# Patient Record
Sex: FEMALE | Race: BLACK OR AFRICAN AMERICAN | Employment: FULL TIME | URBAN - METROPOLITAN AREA
[De-identification: names, ages, dates, MRNs, and addresses within clinical notes are randomized per-mention and may not be internally consistent; named-entity substitution may affect disease eponyms.]

---

## 2017-05-15 ENCOUNTER — APPOINTMENT (OUTPATIENT)
Dept: OCCUPATIONAL MEDICINE | Age: 32
End: 2017-05-15
Attending: FAMILY MEDICINE

## 2017-05-24 ENCOUNTER — APPOINTMENT (OUTPATIENT)
Dept: OCCUPATIONAL MEDICINE | Age: 32
End: 2017-05-24
Attending: EMERGENCY MEDICINE

## 2017-08-11 ENCOUNTER — OFFICE VISIT (OUTPATIENT)
Dept: INTERNAL MEDICINE CLINIC | Facility: CLINIC | Age: 32
End: 2017-08-11

## 2017-08-11 VITALS
SYSTOLIC BLOOD PRESSURE: 109 MMHG | DIASTOLIC BLOOD PRESSURE: 72 MMHG | HEART RATE: 76 BPM | WEIGHT: 195 LBS | HEIGHT: 64.5 IN | BODY MASS INDEX: 32.89 KG/M2 | TEMPERATURE: 98 F

## 2017-08-11 DIAGNOSIS — K21.9 GASTROESOPHAGEAL REFLUX DISEASE, ESOPHAGITIS PRESENCE NOT SPECIFIED: ICD-10-CM

## 2017-08-11 DIAGNOSIS — Z00.00 ENCOUNTER FOR MEDICAL EXAMINATION TO ESTABLISH CARE: Primary | ICD-10-CM

## 2017-08-11 DIAGNOSIS — F41.9 ANXIETY: ICD-10-CM

## 2017-08-11 LAB
ALBUMIN SERPL BCP-MCNC: 3.5 G/DL (ref 3.5–4.8)
ALBUMIN/GLOB SERPL: 1 {RATIO} (ref 1–2)
ALP SERPL-CCNC: 39 U/L (ref 32–100)
ALT SERPL-CCNC: 10 U/L (ref 14–54)
ANION GAP SERPL CALC-SCNC: 10 MMOL/L (ref 0–18)
AST SERPL-CCNC: 15 U/L (ref 15–41)
BASOPHILS # BLD: 0 K/UL (ref 0–0.2)
BASOPHILS NFR BLD: 1 %
BILIRUB SERPL-MCNC: 0.2 MG/DL (ref 0.3–1.2)
BUN SERPL-MCNC: 10 MG/DL (ref 8–20)
BUN/CREAT SERPL: 9.9 (ref 10–20)
CALCIUM SERPL-MCNC: 8.9 MG/DL (ref 8.5–10.5)
CHLORIDE SERPL-SCNC: 105 MMOL/L (ref 95–110)
CHOLEST SERPL-MCNC: 148 MG/DL (ref 110–200)
CO2 SERPL-SCNC: 22 MMOL/L (ref 22–32)
CREAT SERPL-MCNC: 1.01 MG/DL (ref 0.5–1.5)
EOSINOPHIL # BLD: 0 K/UL (ref 0–0.7)
EOSINOPHIL NFR BLD: 1 %
ERYTHROCYTE [DISTWIDTH] IN BLOOD BY AUTOMATED COUNT: 13.5 % (ref 11–15)
GLOBULIN PLAS-MCNC: 3.4 G/DL (ref 2.5–3.7)
GLUCOSE SERPL-MCNC: 112 MG/DL (ref 70–99)
HCT VFR BLD AUTO: 37.8 % (ref 35–48)
HDLC SERPL-MCNC: 43 MG/DL
HGB BLD-MCNC: 12.2 G/DL (ref 12–16)
LDLC SERPL CALC-MCNC: 70 MG/DL (ref 0–99)
LYMPHOCYTES # BLD: 2.4 K/UL (ref 1–4)
LYMPHOCYTES NFR BLD: 33 %
MCH RBC QN AUTO: 27.2 PG (ref 27–32)
MCHC RBC AUTO-ENTMCNC: 32.3 G/DL (ref 32–37)
MCV RBC AUTO: 84.2 FL (ref 80–100)
MONOCYTES # BLD: 0.4 K/UL (ref 0–1)
MONOCYTES NFR BLD: 5 %
NEUTROPHILS # BLD AUTO: 4.3 K/UL (ref 1.8–7.7)
NEUTROPHILS NFR BLD: 60 %
NONHDLC SERPL-MCNC: 105 MG/DL
OSMOLALITY UR CALC.SUM OF ELEC: 284 MOSM/KG (ref 275–295)
PLATELET # BLD AUTO: 215 K/UL (ref 140–400)
PMV BLD AUTO: 9 FL (ref 7.4–10.3)
POTASSIUM SERPL-SCNC: 3.8 MMOL/L (ref 3.3–5.1)
PROT SERPL-MCNC: 6.9 G/DL (ref 5.9–8.4)
RBC # BLD AUTO: 4.5 M/UL (ref 3.7–5.4)
SODIUM SERPL-SCNC: 137 MMOL/L (ref 136–144)
TRIGL SERPL-MCNC: 177 MG/DL (ref 1–149)
TSH SERPL-ACNC: 2.24 UIU/ML (ref 0.45–5.33)
WBC # BLD AUTO: 7.2 K/UL (ref 4–11)

## 2017-08-11 PROCEDURE — 36415 COLL VENOUS BLD VENIPUNCTURE: CPT | Performed by: INTERNAL MEDICINE

## 2017-08-11 PROCEDURE — 99385 PREV VISIT NEW AGE 18-39: CPT | Performed by: INTERNAL MEDICINE

## 2017-08-11 RX ORDER — ACYCLOVIR 400 MG/1
400 TABLET ORAL AS NEEDED
Qty: 90 TABLET | Refills: 1 | Status: SHIPPED | OUTPATIENT
Start: 2017-08-11 | End: 2018-09-13

## 2017-08-11 RX ORDER — OMEPRAZOLE 20 MG/1
20 CAPSULE, DELAYED RELEASE ORAL
Qty: 90 CAPSULE | Refills: 0 | Status: SHIPPED | OUTPATIENT
Start: 2017-08-11 | End: 2017-11-27

## 2017-08-11 RX ORDER — ACYCLOVIR 400 MG/1
400 TABLET ORAL AS NEEDED
COMMUNITY
Start: 2016-07-19 | End: 2017-08-11

## 2017-08-11 RX ORDER — NORGESTIMATE AND ETHINYL ESTRADIOL 7DAYSX3 28
1 KIT ORAL
COMMUNITY
Start: 2017-07-24 | End: 2017-10-04 | Stop reason: ALTCHOICE

## 2017-08-11 RX ORDER — ESCITALOPRAM OXALATE 10 MG/1
10 TABLET ORAL NIGHTLY
COMMUNITY
Start: 2017-04-27 | End: 2017-08-11

## 2017-08-11 RX ORDER — ESCITALOPRAM OXALATE 10 MG/1
10 TABLET ORAL NIGHTLY
Qty: 90 TABLET | Refills: 1 | Status: SHIPPED | OUTPATIENT
Start: 2017-08-11 | End: 2018-09-13

## 2017-08-11 NOTE — PROGRESS NOTES
HPI:    Patient ID: Nikki Thomas is a 32year old female.     HPI She came in today to establish care with new physician  She states that she does have anxiety and panic atack , she run ot of medication for almost one month and she started having panic atac acyclovir 400 MG Oral Tab Take 1 tablet (400 mg total) by mouth as needed. Disp: 90 tablet Rfl: 1   escitalopram 10 MG Oral Tab Take 1 tablet (10 mg total) by mouth nightly.  Disp: 90 tablet Rfl: 1     Allergies:No Known Allergies    HISTORY:  Past Medica discharge. No scleral icterus. Neck: Normal range of motion. Neck supple. No JVD present. No tracheal tenderness present. No tracheal deviation present. No thyroid mass and no thyromegaly present.    Cardiovascular: Normal rate, regular rhythm, normal hea To Free T4 [E]    Meds This Visit:  Signed Prescriptions Disp Refills    acyclovir 400 MG Oral Tab 90 tablet 1      Sig: Take 1 tablet (400 mg total) by mouth as needed.       escitalopram 10 MG Oral Tab 90 tablet 1      Sig: Take 1 tablet (10 mg total) by

## 2017-08-11 NOTE — PATIENT INSTRUCTIONS
Encounter for medical examination to establish care  (primary encounter diagnosis)request records   Anxiety/panic atack, run out of meds, will resume citalopram, advised for relaxation technics- yoga   Gastroesophageal reflux disease, esophagitis presence

## 2017-08-12 LAB — HBA1C MFR BLD: 5.6 % (ref 4–6)

## 2017-09-20 ENCOUNTER — TELEPHONE (OUTPATIENT)
Dept: INTERNAL MEDICINE CLINIC | Facility: CLINIC | Age: 32
End: 2017-09-20

## 2017-09-20 NOTE — TELEPHONE ENCOUNTER
Patient said last time she had Depo shot was about 2 years ago. States last pap test was July 2016 at [facility] in [facility]. Was wanting to get Depo provera shot in your office.

## 2017-09-21 NOTE — TELEPHONE ENCOUNTER
Patient notified needs copy of last pap smear before we can arrange to give Depo shot. Patient to call [facility] Primary care and have copy of pap faxed to office.

## 2017-09-25 ENCOUNTER — TELEPHONE (OUTPATIENT)
Dept: INTERNAL MEDICINE CLINIC | Facility: CLINIC | Age: 32
End: 2017-09-25

## 2017-09-25 NOTE — TELEPHONE ENCOUNTER
Patient record from her last pap smear have been send and scanned .  Please review she would like to come in soon for depo shot

## 2017-09-28 ENCOUNTER — OFFICE VISIT (OUTPATIENT)
Dept: INTERNAL MEDICINE CLINIC | Facility: CLINIC | Age: 32
End: 2017-09-28

## 2017-09-28 VITALS
SYSTOLIC BLOOD PRESSURE: 122 MMHG | WEIGHT: 193 LBS | HEIGHT: 64.5 IN | DIASTOLIC BLOOD PRESSURE: 74 MMHG | BODY MASS INDEX: 32.55 KG/M2 | HEART RATE: 73 BPM

## 2017-09-28 DIAGNOSIS — Z12.4 SCREENING FOR CERVICAL CANCER: Primary | ICD-10-CM

## 2017-09-28 DIAGNOSIS — Z11.3 SCREENING FOR STD (SEXUALLY TRANSMITTED DISEASE): ICD-10-CM

## 2017-09-28 DIAGNOSIS — Z30.09 BIRTH CONTROL COUNSELING: ICD-10-CM

## 2017-09-28 DIAGNOSIS — Z23 NEED FOR PROPHYLACTIC VACCINATION AND INOCULATION AGAINST INFLUENZA: ICD-10-CM

## 2017-09-28 DIAGNOSIS — N89.8 VAGINAL DISCHARGE: ICD-10-CM

## 2017-09-28 PROCEDURE — 99212 OFFICE O/P EST SF 10 MIN: CPT | Performed by: INTERNAL MEDICINE

## 2017-09-28 PROCEDURE — 99214 OFFICE O/P EST MOD 30 MIN: CPT | Performed by: INTERNAL MEDICINE

## 2017-09-28 PROCEDURE — 90686 IIV4 VACC NO PRSV 0.5 ML IM: CPT | Performed by: INTERNAL MEDICINE

## 2017-09-28 PROCEDURE — 90471 IMMUNIZATION ADMIN: CPT | Performed by: INTERNAL MEDICINE

## 2017-09-28 NOTE — PATIENT INSTRUCTIONS
Birth Control Methods  Birth control methods are used to help prevent pregnancy. There are many different methods to choose from.  Talk to your healthcare provider about which method is right for you. Be sure to ask your provider about the effectiveness o A condom is a sheath that forms a thin barrier between the penis and the vagina. It helps prevent pregnancy by keeping sperm from entering the vagina.  When latex condoms are used, they have the added benefit of protecting against most STDs (sexually transm This method requires that you know when in your menstrual cycle you are likely to become pregnant. Then, you avoid sex during those days. This requires careful planning and good discipline. Your healthcare provider can explain more about how this works.   Arnaldo Morales © 0529-7399 [address], [address] [place]. All rights reserved. This information is not intended as a substitute for professional medical care. Always follow your healthcare professional's instructions.

## 2017-09-28 NOTE — PROGRESS NOTES
HPI:    Patient ID: Raman Saldivar is a 28year old female. HPI she came in today for pap smear and screening for std. She also wanted to discuss about contraception, she stopped taking pills one week ago because she forgets taking them . Per her her breas escitalopram 10 MG Oral Tab Take 1 tablet (10 mg total) by mouth nightly.  Disp: 90 tablet Rfl: 1   omeprazole 20 MG Oral Capsule Delayed Release Take 1 capsule (20 mg total) by mouth every morning before breakfast. Disp: 90 capsule Rfl: 0   Norgestim-Eth erythema. Eyes: Conjunctivae and EOM are normal. Pupils are equal, round, and reactive to light. Right eye exhibits no discharge. Left eye exhibits no discharge. No scleral icterus. Neck: Normal range of motion. Neck supple. No JVD present.  No tracheal disease) gc/chlamydia   Vaginal discharge- will send for bacterial vaginosis  Need for prophylactic vaccination and inoculation against influenza      Orders Placed This Encounter      Flulaval 0.5 ml 6 mon and older Quad single dose PF (69911)      ThinPr

## 2017-09-29 LAB
C TRACH DNA SPEC QL NAA+PROBE: NEGATIVE
N GONORRHOEA DNA SPEC QL NAA+PROBE: NEGATIVE

## 2017-09-30 LAB
GENITAL VAGINOSIS SCREEN: NEGATIVE
TRICHOMONAS SCREEN: NEGATIVE

## 2017-10-02 ENCOUNTER — TELEPHONE (OUTPATIENT)
Dept: INTERNAL MEDICINE CLINIC | Facility: CLINIC | Age: 32
End: 2017-10-02

## 2017-10-02 NOTE — TELEPHONE ENCOUNTER
Patient would like to come in to the office to get the depo shot please call when we have the medication and order to make an appoinment

## 2017-10-03 NOTE — TELEPHONE ENCOUNTER
[place] office has 2 depo shots. Call placed to patient to inform we have injection, did not answer. Left detailed message, gave address and phone number. Instructed for patient to call and schedule nurse visit.

## 2017-10-04 ENCOUNTER — NURSE ONLY (OUTPATIENT)
Dept: INTERNAL MEDICINE CLINIC | Facility: CLINIC | Age: 32
End: 2017-10-04

## 2017-10-04 DIAGNOSIS — Z30.013 ENCOUNTER FOR INITIAL PRESCRIPTION OF INJECTABLE CONTRACEPTIVE: Primary | ICD-10-CM

## 2017-10-04 PROCEDURE — 96372 THER/PROPH/DIAG INJ SC/IM: CPT | Performed by: INTERNAL MEDICINE

## 2017-10-04 RX ORDER — MEDROXYPROGESTERONE ACETATE 150 MG/ML
150 INJECTION, SUSPENSION INTRAMUSCULAR ONCE
Status: DISCONTINUED | OUTPATIENT
Start: 2017-10-04 | End: 2017-10-04

## 2017-10-04 RX ORDER — MEDROXYPROGESTERONE ACETATE 150 MG/ML
150 INJECTION, SUSPENSION INTRAMUSCULAR
Status: DISCONTINUED | OUTPATIENT
Start: 2017-10-04 | End: 2018-09-13

## 2017-10-04 RX ADMIN — MEDROXYPROGESTERONE ACETATE 150 MG: 150 INJECTION, SUSPENSION INTRAMUSCULAR at 17:42:00

## 2017-10-04 NOTE — PROGRESS NOTES
Pt presents for depo-provera injection. Order verified on 10/3 encounter. Injected 1 mL prefilled syringe of medroxyprogesterone acetate. Pt reacted well, no adverse reactions during visit. Instructed her to come back in 3 mos.

## 2017-11-27 RX ORDER — OMEPRAZOLE 20 MG/1
CAPSULE, DELAYED RELEASE ORAL
Qty: 30 CAPSULE | Refills: 1 | Status: SHIPPED | OUTPATIENT
Start: 2017-11-27 | End: 2020-12-14

## 2018-01-04 ENCOUNTER — NURSE ONLY (OUTPATIENT)
Dept: INTERNAL MEDICINE CLINIC | Facility: CLINIC | Age: 33
End: 2018-01-04

## 2018-01-04 DIAGNOSIS — N91.2 ABSENCE OF MENSES DUE TO USE OF CONTRACEPTIVE: Primary | ICD-10-CM

## 2018-01-04 LAB
CONTROL LINE PRESENT WITH A CLEAR BACKGROUND (YES/NO): YES YES/NO
KIT LOT #: NORMAL NUMERIC
PREGNANCY TEST, URINE: NEGATIVE

## 2018-01-04 PROCEDURE — 81025 URINE PREGNANCY TEST: CPT | Performed by: INTERNAL MEDICINE

## 2018-01-04 PROCEDURE — 96372 THER/PROPH/DIAG INJ SC/IM: CPT | Performed by: INTERNAL MEDICINE

## 2018-01-04 RX ORDER — MEDROXYPROGESTERONE ACETATE 150 MG/ML
150 INJECTION, SUSPENSION INTRAMUSCULAR
Status: DISCONTINUED | OUTPATIENT
Start: 2018-01-04 | End: 2019-06-27

## 2018-01-04 RX ADMIN — MEDROXYPROGESTERONE ACETATE 150 MG: 150 INJECTION, SUSPENSION INTRAMUSCULAR at 17:19:00

## 2018-01-11 ENCOUNTER — TELEPHONE (OUTPATIENT)
Dept: INTERNAL MEDICINE CLINIC | Facility: CLINIC | Age: 33
End: 2018-01-11

## 2018-01-18 NOTE — TELEPHONE ENCOUNTER
Patient has been receiving depo shots for 4 years. REceived shot 1/9/18,  Started a 7 day detox cleasse on 1/12/18. On 1/15/18 started to have cramping and bleeding every other day, has to wear a pad.   Has never had bleeding every other day and cramping

## 2018-01-19 NOTE — TELEPHONE ENCOUNTER
Patient notified needs to be seen. Has no insurance at this time  Informed we could mail application for [facility]  However, do no advise she wait for that. Should get checked sooner [facility]   Patient agre

## 2018-04-10 ENCOUNTER — APPOINTMENT (OUTPATIENT)
Dept: OTHER | Facility: HOSPITAL | Age: 33
End: 2018-04-10
Attending: ORTHOPAEDIC SURGERY

## 2018-09-13 ENCOUNTER — OFFICE VISIT (OUTPATIENT)
Dept: INTERNAL MEDICINE CLINIC | Facility: CLINIC | Age: 33
End: 2018-09-13
Payer: COMMERCIAL

## 2018-09-13 ENCOUNTER — TELEPHONE (OUTPATIENT)
Dept: INTERNAL MEDICINE CLINIC | Facility: CLINIC | Age: 33
End: 2018-09-13

## 2018-09-13 VITALS
TEMPERATURE: 98 F | BODY MASS INDEX: 34.31 KG/M2 | DIASTOLIC BLOOD PRESSURE: 82 MMHG | WEIGHT: 201 LBS | SYSTOLIC BLOOD PRESSURE: 117 MMHG | RESPIRATION RATE: 18 BRPM | HEIGHT: 64 IN | HEART RATE: 76 BPM

## 2018-09-13 DIAGNOSIS — Z00.00 ANNUAL PHYSICAL EXAM: Primary | ICD-10-CM

## 2018-09-13 DIAGNOSIS — Z97.5 CONTRACEPTIVE DEVICE, INTRAUTERINE: ICD-10-CM

## 2018-09-13 DIAGNOSIS — Z30.013 ENCOUNTER FOR INITIAL PRESCRIPTION OF INJECTABLE CONTRACEPTIVE: ICD-10-CM

## 2018-09-13 LAB
ALBUMIN SERPL BCP-MCNC: 3.9 G/DL (ref 3.5–4.8)
ALBUMIN/GLOB SERPL: 1.2 {RATIO} (ref 1–2)
ALP SERPL-CCNC: 49 U/L (ref 32–100)
ALT SERPL-CCNC: 12 U/L (ref 14–54)
ANION GAP SERPL CALC-SCNC: 8 MMOL/L (ref 0–18)
AST SERPL-CCNC: 23 U/L (ref 15–41)
BASOPHILS # BLD: 0 K/UL (ref 0–0.2)
BASOPHILS NFR BLD: 1 %
BILIRUB SERPL-MCNC: 0.4 MG/DL (ref 0.3–1.2)
BUN SERPL-MCNC: 6 MG/DL (ref 8–20)
BUN/CREAT SERPL: 5.7 (ref 10–20)
CALCIUM SERPL-MCNC: 9.4 MG/DL (ref 8.5–10.5)
CHLORIDE SERPL-SCNC: 106 MMOL/L (ref 95–110)
CHOLEST SERPL-MCNC: 154 MG/DL (ref 110–200)
CO2 SERPL-SCNC: 24 MMOL/L (ref 22–32)
CREAT SERPL-MCNC: 1.06 MG/DL (ref 0.5–1.5)
EOSINOPHIL # BLD: 0.1 K/UL (ref 0–0.7)
EOSINOPHIL NFR BLD: 1 %
ERYTHROCYTE [DISTWIDTH] IN BLOOD BY AUTOMATED COUNT: 14.6 % (ref 11–15)
GLOBULIN PLAS-MCNC: 3.3 G/DL (ref 2.5–3.7)
GLUCOSE SERPL-MCNC: 99 MG/DL (ref 70–99)
HCT VFR BLD AUTO: 40.1 % (ref 35–48)
HDLC SERPL-MCNC: 40 MG/DL
HGB BLD-MCNC: 12.9 G/DL (ref 12–16)
LDLC SERPL CALC-MCNC: 88 MG/DL (ref 0–99)
LYMPHOCYTES # BLD: 2.6 K/UL (ref 1–4)
LYMPHOCYTES NFR BLD: 43 %
MCH RBC QN AUTO: 27.1 PG (ref 27–32)
MCHC RBC AUTO-ENTMCNC: 32.1 G/DL (ref 32–37)
MCV RBC AUTO: 84.5 FL (ref 80–100)
MONOCYTES # BLD: 0.5 K/UL (ref 0–1)
MONOCYTES NFR BLD: 8 %
NEUTROPHILS # BLD AUTO: 2.8 K/UL (ref 1.8–7.7)
NEUTROPHILS NFR BLD: 47 %
NONHDLC SERPL-MCNC: 114 MG/DL
OSMOLALITY UR CALC.SUM OF ELEC: 284 MOSM/KG (ref 275–295)
PATIENT FASTING: YES
PLATELET # BLD AUTO: 231 K/UL (ref 140–400)
PMV BLD AUTO: 10.4 FL (ref 7.4–10.3)
POTASSIUM SERPL-SCNC: 3.9 MMOL/L (ref 3.3–5.1)
PROT SERPL-MCNC: 7.2 G/DL (ref 5.9–8.4)
RBC # BLD AUTO: 4.74 M/UL (ref 3.7–5.4)
SODIUM SERPL-SCNC: 138 MMOL/L (ref 136–144)
TRIGL SERPL-MCNC: 130 MG/DL (ref 1–149)
TSH SERPL-ACNC: 1.11 UIU/ML (ref 0.45–5.33)
WBC # BLD AUTO: 6 K/UL (ref 4–11)

## 2018-09-13 PROCEDURE — 99395 PREV VISIT EST AGE 18-39: CPT | Performed by: INTERNAL MEDICINE

## 2018-09-13 RX ORDER — ACYCLOVIR 400 MG/1
400 TABLET ORAL AS NEEDED
Qty: 90 TABLET | Refills: 1 | Status: SHIPPED | OUTPATIENT
Start: 2018-09-13 | End: 2020-01-12

## 2018-09-13 RX ORDER — ESCITALOPRAM OXALATE 10 MG/1
10 TABLET ORAL NIGHTLY
Qty: 90 TABLET | Refills: 1 | Status: SHIPPED | OUTPATIENT
Start: 2018-09-13 | End: 2020-12-04

## 2018-09-13 RX ORDER — MEDROXYPROGESTERONE ACETATE 150 MG/ML
150 INJECTION, SUSPENSION INTRAMUSCULAR
Status: COMPLETED | OUTPATIENT
Start: 2018-09-29 | End: 2018-09-15

## 2018-09-13 NOTE — PROGRESS NOTES
HPI:   Jamie Mccauley is a 35year old female who presents for a complete physical exam. Her period is irregular since  she stopped taking depo injections .   She was to discuss other contraceptive options  She also needs refill on her anxiety medication she in/around ear, sinus pressure and sore throat. Eyes Negative Eye pain and vision changes. Respiratory Negative Cough, dyspnea and wheezing. Cardio Negative Chest pain, claudication, edema and irregular heartbeat/palpitations.    GI Negative Abdominal abdominal tenderness or masses palpable   Musculoskeletal Normal Overview - Normal. No swelling or deformities.    Skin Normal Inspection - Normal.   Neurological Normal Memory - Normal. Sensory - Normal. Motor - Normal. Balance & gait - Normal.   Psychiatr

## 2018-09-15 ENCOUNTER — NURSE ONLY (OUTPATIENT)
Dept: INTERNAL MEDICINE CLINIC | Facility: CLINIC | Age: 33
End: 2018-09-15
Payer: COMMERCIAL

## 2018-09-15 DIAGNOSIS — Z30.42 DEPO-PROVERA CONTRACEPTIVE STATUS: Primary | ICD-10-CM

## 2018-09-15 PROCEDURE — 96372 THER/PROPH/DIAG INJ SC/IM: CPT | Performed by: INTERNAL MEDICINE

## 2018-09-15 PROCEDURE — 81025 URINE PREGNANCY TEST: CPT | Performed by: INTERNAL MEDICINE

## 2018-09-15 RX ADMIN — MEDROXYPROGESTERONE ACETATE 150 MG: 150 INJECTION, SUSPENSION INTRAMUSCULAR at 10:02:00

## 2018-11-20 ENCOUNTER — OFFICE VISIT (OUTPATIENT)
Dept: INTERNAL MEDICINE CLINIC | Facility: CLINIC | Age: 33
End: 2018-11-20
Payer: COMMERCIAL

## 2018-11-20 VITALS
RESPIRATION RATE: 18 BRPM | WEIGHT: 202 LBS | TEMPERATURE: 98 F | SYSTOLIC BLOOD PRESSURE: 124 MMHG | BODY MASS INDEX: 34.49 KG/M2 | HEART RATE: 78 BPM | HEIGHT: 64 IN | DIASTOLIC BLOOD PRESSURE: 75 MMHG

## 2018-11-20 DIAGNOSIS — M79.642 LEFT HAND PAIN: Primary | ICD-10-CM

## 2018-11-20 PROCEDURE — 99213 OFFICE O/P EST LOW 20 MIN: CPT | Performed by: INTERNAL MEDICINE

## 2018-11-20 RX ORDER — ESCITALOPRAM OXALATE 10 MG/1
10 TABLET ORAL
COMMUNITY
Start: 2017-04-27 | End: 2020-12-04

## 2018-11-20 RX ORDER — PENICILLIN V POTASSIUM 500 MG/1
TABLET ORAL
COMMUNITY
Start: 2018-07-27 | End: 2021-05-21

## 2018-11-20 RX ORDER — TRAMADOL HYDROCHLORIDE 50 MG/1
TABLET ORAL
Refills: 0 | COMMUNITY
Start: 2018-07-27

## 2018-11-20 RX ORDER — IBUPROFEN 800 MG/1
TABLET ORAL
COMMUNITY
Start: 2018-07-27

## 2018-11-20 RX ORDER — MEDROXYPROGESTERONE ACETATE 150 MG/ML
INJECTION, SUSPENSION INTRAMUSCULAR
COMMUNITY
Start: 2018-09-13 | End: 2019-06-27

## 2018-11-20 NOTE — PROGRESS NOTES
HPI:    Patient ID: Liliana Garcia is a 35year old female. HPI she came in today complaining of left hand pain. According to her started 2 weeks ago she was lifting some boxes at work. She denies any radiation of pain  , no  tingling or numbness.   She OMEPRAZOLE 20 MG Oral Capsule Delayed Release TAKE ONE CAPSULE BY MOUTH EVERY MORNING BEFORE BREAKFAST.  Disp: 30 capsule Rfl: 1   ibuprofen 800 MG Oral Tab  Disp:  Rfl:    MedroxyPROGESTERone Acetate 150 MG/ML Intramuscular Suspension  Disp:  Rfl:    penic Mouth/Throat: Uvula is midline, oropharynx is clear and moist and mucous membranes are normal. Mucous membranes are not cyanotic. No oropharyngeal exudate, posterior oropharyngeal edema or posterior oropharyngeal erythema.    Eyes: Conjunctivae and EOM are Psychiatric: She has a normal mood and affect. Her behavior is normal.   Vitals reviewed.            ASSESSMENT/PLAN:   Left hand pain  (primary encounter diagnosis) advised her to use wrist splint , take ibuprofen as needed for pain if not better follow-up

## 2018-11-20 NOTE — PATIENT INSTRUCTIONS
Left hand pain  (primary encounter diagnosis) advised her to use wrist splint , take ibuprofen as needed for pain if not better follow-up

## 2018-12-27 ENCOUNTER — NURSE ONLY (OUTPATIENT)
Dept: INTERNAL MEDICINE CLINIC | Facility: CLINIC | Age: 33
End: 2018-12-27
Payer: COMMERCIAL

## 2018-12-27 DIAGNOSIS — Z30.09 BIRTH CONTROL COUNSELING: Primary | ICD-10-CM

## 2018-12-27 RX ORDER — MEDROXYPROGESTERONE ACETATE 150 MG/ML
150 INJECTION, SUSPENSION INTRAMUSCULAR ONCE
Status: DISCONTINUED | OUTPATIENT
Start: 2018-12-27 | End: 2019-06-27

## 2019-03-27 ENCOUNTER — NURSE ONLY (OUTPATIENT)
Dept: INTERNAL MEDICINE CLINIC | Facility: CLINIC | Age: 34
End: 2019-03-27
Payer: COMMERCIAL

## 2019-03-27 DIAGNOSIS — Z30.09 BIRTH CONTROL COUNSELING: Primary | ICD-10-CM

## 2019-03-27 PROCEDURE — 96372 THER/PROPH/DIAG INJ SC/IM: CPT | Performed by: INTERNAL MEDICINE

## 2019-03-27 RX ORDER — MEDROXYPROGESTERONE ACETATE 150 MG/ML
150 INJECTION, SUSPENSION INTRAMUSCULAR ONCE
Status: DISCONTINUED | OUTPATIENT
Start: 2019-03-27 | End: 2019-03-27

## 2019-03-27 RX ORDER — MEDROXYPROGESTERONE ACETATE 150 MG/ML
150 INJECTION, SUSPENSION INTRAMUSCULAR
Status: DISCONTINUED | OUTPATIENT
Start: 2019-03-27 | End: 2019-06-27

## 2019-03-27 RX ADMIN — MEDROXYPROGESTERONE ACETATE 150 MG: 150 INJECTION, SUSPENSION INTRAMUSCULAR at 11:48:00

## 2019-06-21 ENCOUNTER — TELEPHONE (OUTPATIENT)
Dept: INTERNAL MEDICINE CLINIC | Facility: CLINIC | Age: 34
End: 2019-06-21

## 2019-06-21 NOTE — TELEPHONE ENCOUNTER
I spoke with her, she had  Light period earlier . No pain, she gets her depo shot regularly .  Advised her to continue with same schedule, use extra protection this month , if happens again  Follow up , will refer her to see ob

## 2019-06-21 NOTE — TELEPHONE ENCOUNTER
Pt calling regarding this first message, pt is in the [facility] office area and would like to stop

## 2019-06-21 NOTE — TELEPHONE ENCOUNTER
Due for Depo shot next week 6/27  Concerned as she started her menstrual cycle  Wednesday 6/19/19 not heavy light and reddish/brown in color. Asking if normal thought she would not get cycle  And asking if she may still get depo next week?

## 2019-06-27 ENCOUNTER — NURSE ONLY (OUTPATIENT)
Dept: INTERNAL MEDICINE CLINIC | Facility: CLINIC | Age: 34
End: 2019-06-27

## 2019-06-27 DIAGNOSIS — Z30.9 ENCOUNTER FOR CONTRACEPTIVE MANAGEMENT, UNSPECIFIED TYPE: Primary | ICD-10-CM

## 2019-06-27 RX ORDER — MEDROXYPROGESTERONE ACETATE 150 MG/ML
150 INJECTION, SUSPENSION INTRAMUSCULAR ONCE
Status: DISCONTINUED | OUTPATIENT
Start: 2019-06-27 | End: 2021-11-23

## 2019-06-27 NOTE — PROGRESS NOTES
Patient here for Depo Provera Injection. Depo-provera 150 mg/ml given IM right deltoid per patient request  Fabian Griffin 47 [id number]  Lot # [id number]  Exp 9/30/22  Tolerated injection well.    Still having light bleeding  Considering going on oral contraceptive inste

## 2019-08-20 ENCOUNTER — OFFICE VISIT (OUTPATIENT)
Dept: INTERNAL MEDICINE CLINIC | Facility: CLINIC | Age: 34
End: 2019-08-20
Payer: COMMERCIAL

## 2019-08-20 VITALS
TEMPERATURE: 99 F | HEIGHT: 64 IN | HEART RATE: 78 BPM | RESPIRATION RATE: 18 BRPM | DIASTOLIC BLOOD PRESSURE: 78 MMHG | BODY MASS INDEX: 34.66 KG/M2 | WEIGHT: 203 LBS | SYSTOLIC BLOOD PRESSURE: 115 MMHG

## 2019-08-20 DIAGNOSIS — Z00.00 ANNUAL PHYSICAL EXAM: Primary | ICD-10-CM

## 2019-08-20 DIAGNOSIS — N64.4 BREAST PAIN: ICD-10-CM

## 2019-08-20 DIAGNOSIS — Z30.014 ENCOUNTER FOR INITIAL PRESCRIPTION OF INTRAUTERINE CONTRACEPTIVE DEVICE (IUD): ICD-10-CM

## 2019-08-20 PROCEDURE — 99395 PREV VISIT EST AGE 18-39: CPT | Performed by: INTERNAL MEDICINE

## 2019-08-20 NOTE — PROGRESS NOTES
HPI:   Kenneth Hobbs is a 35year old female who presents for a complete physical exam. Symptoms: denies discharge, itching, burning or dysuria, periods are regular. Patient complains of  She states that her period is irregular .  She want to stop depo shot Social History:   Social History    Tobacco Use      Smoking status: Never Smoker      Smokeless tobacco: Never Used    Alcohol use: Yes      Frequency: Never      Comment: socially    Drug use: No    Exercise: minimal.  Diet: watches minimally     REVIE Self breast exam has been taught. Patient performs self breast exam.   Respiratory Normal Auscultation - Normal, no wheezes or rales   Cardiovascular Normal Regular rate and rhythm.  No murmurs, gallops, or rubs   Vascular Normal Pulses - Dorsalis pedis: No

## 2019-08-28 ENCOUNTER — HOSPITAL ENCOUNTER (OUTPATIENT)
Dept: MAMMOGRAPHY | Facility: HOSPITAL | Age: 34
Discharge: HOME | End: 2019-08-28
Attending: INTERNAL MEDICINE
Payer: COMMERCIAL

## 2019-08-28 DIAGNOSIS — N64.4 BREAST PAIN: ICD-10-CM

## 2019-08-28 PROCEDURE — 77062 BREAST TOMOSYNTHESIS BI: CPT | Performed by: INTERNAL MEDICINE

## 2019-08-28 PROCEDURE — 77066 DX MAMMO INCL CAD BI: CPT | Performed by: INTERNAL MEDICINE

## 2019-12-13 ENCOUNTER — OFFICE VISIT (OUTPATIENT)
Dept: INTERNAL MEDICINE CLINIC | Facility: CLINIC | Age: 34
End: 2019-12-13
Payer: COMMERCIAL

## 2019-12-13 VITALS
WEIGHT: 208 LBS | DIASTOLIC BLOOD PRESSURE: 76 MMHG | SYSTOLIC BLOOD PRESSURE: 112 MMHG | RESPIRATION RATE: 18 BRPM | TEMPERATURE: 98 F | HEIGHT: 64 IN | HEART RATE: 74 BPM | BODY MASS INDEX: 35.51 KG/M2

## 2019-12-13 DIAGNOSIS — R21 RASH: Primary | ICD-10-CM

## 2019-12-13 PROBLEM — Z30.09 BIRTH CONTROL COUNSELING: Status: RESOLVED | Noted: 2017-09-28 | Resolved: 2019-12-13

## 2019-12-13 PROCEDURE — 99213 OFFICE O/P EST LOW 20 MIN: CPT | Performed by: INTERNAL MEDICINE

## 2019-12-13 PROCEDURE — 90686 IIV4 VACC NO PRSV 0.5 ML IM: CPT | Performed by: INTERNAL MEDICINE

## 2019-12-13 PROCEDURE — 90471 IMMUNIZATION ADMIN: CPT | Performed by: INTERNAL MEDICINE

## 2019-12-13 RX ORDER — CLOTRIMAZOLE AND BETAMETHASONE DIPROPIONATE 10; .64 MG/G; MG/G
CREAM TOPICAL
Qty: 60 G | Refills: 0 | Status: SHIPPED | OUTPATIENT
Start: 2019-12-13

## 2019-12-13 NOTE — PATIENT INSTRUCTIONS
Fungal dermatitis - will try topical antifungal- advised her to  [facility] the are with warm water and soap and dry well, apply cream after that bid

## 2019-12-13 NOTE — PROGRESS NOTES
HPI:    Patient ID: Charlie Wilcox is a 29year old female. HPI she came in today complaining of rash under her breast as well as groin area.   According to her started few days ago she states that she recently changed her soap as well as her washing deter TraMADol HCl 50 MG Oral Tab   0   • escitalopram 10 MG Oral Tab Take 10 mg by mouth. • escitalopram 10 MG Oral Tab Take 1 tablet (10 mg total) by mouth nightly. 90 tablet 1   • acyclovir 400 MG Oral Tab Take 1 tablet (400 mg total) by mouth as needed. moist and mucous membranes are normal. Mucous membranes are not cyanotic. No oropharyngeal exudate, posterior oropharyngeal edema or posterior oropharyngeal erythema. Eyes: Pupils are equal, round, and reactive to light.  Conjunctivae and EOM are normal. Visit:  Requested Prescriptions     Signed Prescriptions Disp Refills   • clotrimazole-betamethasone 1-0.05 % External Cream 60 g 0     Sig: Apply to affected are bid       Imaging & Referrals:  FLULAVAL INFLUENZA VACCINE QUAD PRESERVATIVE FREE 0.5 ML

## 2020-01-12 NOTE — TELEPHONE ENCOUNTER
Review pended refill request as it does not fall under a protocol.     Last Rx: 9/13/18  LOV: 1 month ago

## 2020-01-13 ENCOUNTER — TELEPHONE (OUTPATIENT)
Dept: OTHER | Age: 35
End: 2020-01-13

## 2020-01-13 RX ORDER — ACYCLOVIR 400 MG/1
400 TABLET ORAL AS NEEDED
Qty: 90 TABLET | Refills: 1 | Status: SHIPPED | OUTPATIENT
Start: 2020-01-13

## 2020-01-13 NOTE — TELEPHONE ENCOUNTER
Patient states taking medication for sore area on buttock, burning, itching. Did you want to see patient ?

## 2020-01-13 NOTE — TELEPHONE ENCOUNTER
We need to see ? Does she have herpes ? Yes , does  she have  Herpes now?  Will need to be seen if this is the case

## 2020-01-13 NOTE — TELEPHONE ENCOUNTER
Patient stated she has had Herpes in the past and believes this is a flair up. States she used to take Acyclovir 400 mg twice a day as needed. Scheduled patient for an office visit with Dr. Sherlyn Bryson on 1/16. Informed pharmacy.

## 2020-01-13 NOTE — TELEPHONE ENCOUNTER
Kimberly from [facility] requesting clarification on directions for acyclovir. Need to know how many times a day to take medication? Please advise.      Pharmacy     [facility] Kim Rhodes, [address] [place] [address] [phone], [phone]

## 2020-01-16 ENCOUNTER — LAB ENCOUNTER (OUTPATIENT)
Dept: LAB | Facility: HOSPITAL | Age: 35
End: 2020-01-16
Attending: INTERNAL MEDICINE
Payer: COMMERCIAL

## 2020-01-16 ENCOUNTER — OFFICE VISIT (OUTPATIENT)
Dept: INTERNAL MEDICINE CLINIC | Facility: CLINIC | Age: 35
End: 2020-01-16
Payer: COMMERCIAL

## 2020-01-16 VITALS
SYSTOLIC BLOOD PRESSURE: 121 MMHG | TEMPERATURE: 97 F | HEIGHT: 64 IN | WEIGHT: 205 LBS | BODY MASS INDEX: 35 KG/M2 | DIASTOLIC BLOOD PRESSURE: 81 MMHG | RESPIRATION RATE: 18 BRPM | HEART RATE: 74 BPM

## 2020-01-16 DIAGNOSIS — Z86.19 H/O HERPES GENITALIS: ICD-10-CM

## 2020-01-16 DIAGNOSIS — R20.2 TINGLING SENSATION: Primary | ICD-10-CM

## 2020-01-16 PROCEDURE — 36415 COLL VENOUS BLD VENIPUNCTURE: CPT

## 2020-01-16 PROCEDURE — 86696 HERPES SIMPLEX TYPE 2 TEST: CPT

## 2020-01-16 PROCEDURE — 86695 HERPES SIMPLEX TYPE 1 TEST: CPT

## 2020-01-16 PROCEDURE — 99213 OFFICE O/P EST LOW 20 MIN: CPT | Performed by: INTERNAL MEDICINE

## 2020-01-16 PROCEDURE — 86694 HERPES SIMPLEX NES ANTBDY: CPT

## 2020-01-16 NOTE — PROGRESS NOTES
HPI:    Patient ID: Carlos Yeager is a 29year old female. HPI she is here today complainig of tingling sensation on her left buttock . Started on Sykes states that in 2013 she had tingling  sensation but at that time she developed blisters and was mouth as needed.  90 tablet 1   • clotrimazole-betamethasone 1-0.05 % External Cream Apply to affected are bid 60 g 0   • ibuprofen 800 MG Oral Tab      • TraMADol HCl 50 MG Oral Tab   0   • escitalopram 10 MG Oral Tab Take 1 tablet (10 mg total) by mouth n midline, oropharynx is clear and moist and mucous membranes are normal. Mucous membranes are not cyanotic. No oropharyngeal exudate, posterior oropharyngeal edema or posterior oropharyngeal erythema. Eyes: Pupils are equal, round, and reactive to light. Referrals:  None        OE#[id number]

## 2020-01-18 LAB
HSV 1 GLYCOPROTEIN G AB, IGG: 1.04 IV
HSV 2 GLYCOPROTEIN G AB, IGG: 10.3 IV

## 2020-01-19 LAB
HSV TYPE 1/2 COMBINED AB, IGG: >22.4 IV
HSV TYPE 1/2 COMBINED ABS, IGM: 0.78 IV

## 2020-12-01 ENCOUNTER — TELEMEDICINE (OUTPATIENT)
Dept: INTERNAL MEDICINE CLINIC | Facility: CLINIC | Age: 35
End: 2020-12-01

## 2020-12-01 DIAGNOSIS — N89.8 VAGINAL ITCHING: Primary | ICD-10-CM

## 2020-12-01 PROCEDURE — 99213 OFFICE O/P EST LOW 20 MIN: CPT | Performed by: INTERNAL MEDICINE

## 2020-12-01 RX ORDER — FLUCONAZOLE 150 MG/1
150 TABLET ORAL ONCE
Qty: 1 TABLET | Refills: 0 | Status: SHIPPED | OUTPATIENT
Start: 2020-12-01 | End: 2020-12-01

## 2020-12-01 NOTE — PROGRESS NOTES
This is a telemedicine visit with live, interactive video and audio. Patient understands and accepts financial responsibility for any deductible, co-insurance and/or co-pays associated with this service.     SUBJECTIVE  She called today complaining of v CAPSULE BY MOUTH EVERY MORNING BEFORE BREAKFAST.  30 capsule 1   Review of system positive for vaginal itchiness    OBJECTIVE  Physical Exam:   She is awake alert oriented x3 she speaks in full sentences she appears in no distress    ASSESSMENT & PLAN  Vagi

## 2020-12-04 ENCOUNTER — OFFICE VISIT (OUTPATIENT)
Dept: INTERNAL MEDICINE CLINIC | Facility: CLINIC | Age: 35
End: 2020-12-04
Payer: COMMERCIAL

## 2020-12-04 ENCOUNTER — MED REC SCAN ONLY (OUTPATIENT)
Dept: INTERNAL MEDICINE CLINIC | Facility: CLINIC | Age: 35
End: 2020-12-04

## 2020-12-04 VITALS
WEIGHT: 207 LBS | HEIGHT: 64 IN | TEMPERATURE: 98 F | DIASTOLIC BLOOD PRESSURE: 74 MMHG | OXYGEN SATURATION: 98 % | BODY MASS INDEX: 35.34 KG/M2 | HEART RATE: 72 BPM | SYSTOLIC BLOOD PRESSURE: 112 MMHG

## 2020-12-04 DIAGNOSIS — Z12.4 SCREENING FOR CERVICAL CANCER: ICD-10-CM

## 2020-12-04 DIAGNOSIS — D50.8 OTHER IRON DEFICIENCY ANEMIA: ICD-10-CM

## 2020-12-04 DIAGNOSIS — F41.9 ANXIETY: ICD-10-CM

## 2020-12-04 DIAGNOSIS — Z00.00 ANNUAL PHYSICAL EXAM: Primary | ICD-10-CM

## 2020-12-04 PROCEDURE — 36415 COLL VENOUS BLD VENIPUNCTURE: CPT | Performed by: INTERNAL MEDICINE

## 2020-12-04 PROCEDURE — 3074F SYST BP LT 130 MM HG: CPT | Performed by: INTERNAL MEDICINE

## 2020-12-04 PROCEDURE — 3078F DIAST BP <80 MM HG: CPT | Performed by: INTERNAL MEDICINE

## 2020-12-04 PROCEDURE — 90686 IIV4 VACC NO PRSV 0.5 ML IM: CPT | Performed by: INTERNAL MEDICINE

## 2020-12-04 PROCEDURE — 90471 IMMUNIZATION ADMIN: CPT | Performed by: INTERNAL MEDICINE

## 2020-12-04 PROCEDURE — 3008F BODY MASS INDEX DOCD: CPT | Performed by: INTERNAL MEDICINE

## 2020-12-04 PROCEDURE — 99395 PREV VISIT EST AGE 18-39: CPT | Performed by: INTERNAL MEDICINE

## 2020-12-04 NOTE — PROGRESS NOTES
HPI:   Everette Arriola is a 28year old female who presents for a complete physical exam. .     Wt Readings from Last 3 Encounters:  12/04/20 : 207 lb (93.9 kg)  01/16/20 : 205 lb (93 kg)  12/13/19 : 208 lb (94.3 kg)    Body mass index is 35.53 kg/m².      Cho Great-Grandparent         mat great aunt unknown age      Social History:   Social History    Tobacco Use      Smoking status: Never Smoker      Smokeless tobacco: Never Used    Alcohol use: Yes      Frequency: Never      Comment: socially    Drug use:  No Normal Palpation - Normal. No thyromegaly or lymphadenopathy   Breast Normal Inspection, palpation, nipples normal bilaterally. Self breast exam has been taught.  Patient performs self breast exam.   Respiratory Normal Auscultation - Normal, no wheezes or r

## 2020-12-14 RX ORDER — OMEPRAZOLE 20 MG/1
20 CAPSULE, DELAYED RELEASE ORAL
Qty: 30 CAPSULE | Refills: 1 | Status: SHIPPED | OUTPATIENT
Start: 2020-12-14 | End: 2021-03-08

## 2021-03-08 RX ORDER — OMEPRAZOLE 20 MG/1
20 CAPSULE, DELAYED RELEASE ORAL
Qty: 30 CAPSULE | Refills: 0 | Status: SHIPPED | OUTPATIENT
Start: 2021-03-08 | End: 2021-04-08

## 2021-04-08 RX ORDER — OMEPRAZOLE 20 MG/1
20 CAPSULE, DELAYED RELEASE ORAL
Qty: 30 CAPSULE | Refills: 0 | Status: SHIPPED | OUTPATIENT
Start: 2021-04-08 | End: 2021-05-10

## 2021-04-10 ENCOUNTER — IMMUNIZATION (OUTPATIENT)
Dept: LAB | Facility: HOSPITAL | Age: 36
End: 2021-04-10
Attending: HOSPITALIST
Payer: COMMERCIAL

## 2021-04-10 DIAGNOSIS — Z23 NEED FOR VACCINATION: Primary | ICD-10-CM

## 2021-04-10 PROCEDURE — 0011A SARSCOV2 VAC 100MCG/0.5ML IM: CPT

## 2021-04-22 NOTE — PROGRESS NOTES
This is a telemedicine visit with live, interactive video and audio. Patient understands and accepts financial responsibility for any deductible, co-insurance and/or co-pays associated with this service.     SUBJECTIVE  She scheduled video visit today t Oral Tab   0     Ros: + anxiety ,   OBJECTIVE  Physical Exam:   Alert oriented x3 she speaks in full sentences she appears in no distress    ASSESSMENT & PLAN  Anxiety -with her how to cope with daily stress and anxiety I will start her on citalopram discu

## 2021-05-08 ENCOUNTER — IMMUNIZATION (OUTPATIENT)
Dept: LAB | Facility: HOSPITAL | Age: 36
End: 2021-05-08
Attending: EMERGENCY MEDICINE
Payer: COMMERCIAL

## 2021-05-08 DIAGNOSIS — Z23 NEED FOR VACCINATION: Primary | ICD-10-CM

## 2021-05-08 PROCEDURE — 0012A SARSCOV2 VAC 100MCG/0.5ML IM: CPT

## 2021-05-10 RX ORDER — OMEPRAZOLE 20 MG/1
20 CAPSULE, DELAYED RELEASE ORAL
Qty: 30 CAPSULE | Refills: 0 | Status: SHIPPED | OUTPATIENT
Start: 2021-05-10 | End: 2021-06-14

## 2021-05-21 NOTE — PROGRESS NOTES
This is a telemedicine visit with live, interactive video and audio. Patient understands and accepts financial responsibility for any deductible, co-insurance and/or co-pays associated with this service.     SUBJECTIVE     She comes in today for follow- • TraMADol HCl 50 MG Oral Tab   0     Review of system denies any complaints  OBJECTIVE  Physical Exam:     She is awake alert oriented x3 she speaks in full sentences she appears in no distress  ASSESSMENT & PLAN  There are no diagnoses linked to this

## 2021-06-14 RX ORDER — OMEPRAZOLE 20 MG/1
20 CAPSULE, DELAYED RELEASE ORAL
Qty: 30 CAPSULE | Refills: 0 | Status: SHIPPED | OUTPATIENT
Start: 2021-06-14 | End: 2021-07-14

## 2021-07-05 RX ORDER — NORGESTIMATE AND ETHINYL ESTRADIOL 7DAYSX3 LO
KIT ORAL
Qty: 84 TABLET | Refills: 0 | OUTPATIENT
Start: 2021-07-05

## 2021-07-08 ENCOUNTER — TELEMEDICINE (OUTPATIENT)
Dept: INTERNAL MEDICINE CLINIC | Facility: CLINIC | Age: 36
End: 2021-07-08

## 2021-07-08 DIAGNOSIS — L50.9 HIVES: Primary | ICD-10-CM

## 2021-07-08 DIAGNOSIS — L29.9 PRURITUS: ICD-10-CM

## 2021-07-08 DIAGNOSIS — R21 RASH: ICD-10-CM

## 2021-07-08 PROCEDURE — 99213 OFFICE O/P EST LOW 20 MIN: CPT | Performed by: INTERNAL MEDICINE

## 2021-07-08 RX ORDER — FAMOTIDINE 20 MG/1
20 TABLET ORAL 2 TIMES DAILY
Qty: 30 TABLET | Refills: 0 | Status: SHIPPED | OUTPATIENT
Start: 2021-07-08 | End: 2021-07-26

## 2021-07-08 RX ORDER — PREDNISONE 10 MG/1
20 TABLET ORAL DAILY
Qty: 6 TABLET | Refills: 0 | Status: SHIPPED | OUTPATIENT
Start: 2021-07-08 | End: 2021-09-23

## 2021-07-08 NOTE — PROGRESS NOTES
This is a telemedicine visit with live, interactive video and audio. Patient understands and accepts financial responsibility for any deductible, co-insurance and/or co-pays associated with this service.     SUBJECTIV  She schedule video visit today com reaction -advised her to use famotidine twice a day, continue with Claritin in the morning, I will send prednisone for few days, if not better if any shortness of breath difficulty swallowing or breathing go to emergency room  Due to her recurrent symptoms

## 2021-07-14 RX ORDER — OMEPRAZOLE 20 MG/1
20 CAPSULE, DELAYED RELEASE ORAL
Qty: 30 CAPSULE | Refills: 0 | Status: SHIPPED | OUTPATIENT
Start: 2021-07-14 | End: 2021-09-18

## 2021-07-23 RX ORDER — NORGESTIMATE AND ETHINYL ESTRADIOL 7DAYSX3 LO
KIT ORAL
Qty: 84 TABLET | Refills: 0 | Status: SHIPPED | OUTPATIENT
Start: 2021-07-23 | End: 2021-11-23

## 2021-07-26 RX ORDER — FAMOTIDINE 20 MG/1
20 TABLET, FILM COATED ORAL 2 TIMES DAILY
Qty: 30 TABLET | Refills: 0 | OUTPATIENT
Start: 2021-07-26

## 2021-07-26 RX ORDER — FAMOTIDINE 20 MG/1
20 TABLET, FILM COATED ORAL 2 TIMES DAILY
Qty: 30 TABLET | Refills: 0 | Status: SHIPPED | OUTPATIENT
Start: 2021-07-26 | End: 2021-08-04

## 2021-08-02 ENCOUNTER — TELEPHONE (OUTPATIENT)
Dept: INTERNAL MEDICINE CLINIC | Facility: CLINIC | Age: 36
End: 2021-08-02

## 2021-08-02 NOTE — TELEPHONE ENCOUNTER
Pharmacy calling stating that pt received da 15 day script for her famoTIDine 20 MG Oral Tab but pt was hoping for 30 day. Please advise.

## 2021-08-09 ENCOUNTER — MED REC SCAN ONLY (OUTPATIENT)
Dept: INTERNAL MEDICINE CLINIC | Facility: CLINIC | Age: 36
End: 2021-08-09

## 2021-09-18 RX ORDER — FAMOTIDINE 20 MG/1
TABLET, FILM COATED ORAL
Qty: 60 TABLET | Refills: 0 | OUTPATIENT
Start: 2021-09-18

## 2021-09-18 RX ORDER — OMEPRAZOLE 20 MG/1
20 CAPSULE, DELAYED RELEASE ORAL
Qty: 30 CAPSULE | Refills: 0 | Status: SHIPPED | OUTPATIENT
Start: 2021-09-18 | End: 2021-09-23

## 2021-09-20 RX ORDER — FAMOTIDINE 20 MG/1
TABLET, FILM COATED ORAL
Qty: 60 TABLET | Refills: 0 | OUTPATIENT
Start: 2021-09-20

## 2021-09-23 ENCOUNTER — TELEMEDICINE (OUTPATIENT)
Dept: INTERNAL MEDICINE CLINIC | Facility: CLINIC | Age: 36
End: 2021-09-23

## 2021-09-23 DIAGNOSIS — N89.8 VAGINAL DISCHARGE: Primary | ICD-10-CM

## 2021-09-23 PROCEDURE — 99213 OFFICE O/P EST LOW 20 MIN: CPT | Performed by: INTERNAL MEDICINE

## 2021-09-23 RX ORDER — FAMOTIDINE 20 MG/1
20 TABLET ORAL 2 TIMES DAILY
Qty: 60 TABLET | Refills: 0 | Status: SHIPPED | OUTPATIENT
Start: 2021-09-23 | End: 2021-10-29

## 2021-09-23 NOTE — PROGRESS NOTES
This is a telemedicine visit with live, interactive video and audio. Patient understands and accepts financial responsibility for any deductible, co-insurance and/or co-pays associated with this service.     SUBJECTIVE  She is scheduled to have video vi daily. 6 tablet 0   • Citalopram Hydrobromide 10 MG Oral Tab Take 1 tablet (10 mg total) by mouth daily. 90 tablet 0   • acyclovir 400 MG Oral Tab Take 1 tablet (400 mg total) by mouth as needed.  90 tablet 1   • clotrimazole-betamethasone 1-0.05 % External

## 2021-09-23 NOTE — TELEPHONE ENCOUNTER
Dr. Candelaria Villafuerte,   Refill passed per [facility] protocol.   Patient has two similar  meds , please advise on Famotidine refill , thank you     Provider Information    Authorizing Provider Encounter Provider Ordering Provider   MD Erica Shepherd

## 2021-10-29 RX ORDER — FAMOTIDINE 20 MG/1
20 TABLET, FILM COATED ORAL 2 TIMES DAILY
Qty: 180 TABLET | Refills: 1 | Status: SHIPPED | OUTPATIENT
Start: 2021-10-29 | End: 2022-09-27

## 2021-10-29 NOTE — TELEPHONE ENCOUNTER
Refill passed per [facility], [facility] protocol.     Requested Prescriptions   Pending Prescriptions Disp Refills    FAMOTIDINE 20 MG Oral Tab [Pharmacy Med Name: Famotidine 20 Mg Tab Teva] 60 tablet 0     Sig: TAKE ONE TABLET BY MOUTH TWICE DAILY        Gastrointestional Medication Protocol Passed - 10/29/2021  9:36 AM        Passed - Appointment in past 15 or next 3 months                  Recent Outpatient Visits              1 month ago Vaginal discharge    Xochitl Henry MD    Telemedicine    3 months ago Hammad Choe MD    Telemedicine    5 months ago [facility]    [facility]    6 months ago [facility] [address]    [facility]    10 months ago Annual physical exam    Xochitl Henry MD    Office Visit

## 2021-11-02 RX ORDER — NORGESTIMATE AND ETHINYL ESTRADIOL 7DAYSX3 LO
1 KIT ORAL DAILY
Qty: 84 TABLET | Refills: 1 | OUTPATIENT
Start: 2021-11-02

## 2021-11-02 NOTE — TELEPHONE ENCOUNTER
Please review; RN gets duplicate therapy warning:    Duplicate Therapy: medroxyPROGESTERone Acetate, Norgestim-Eth Estrad Triphasic    Requested Prescriptions   Pending Prescriptions Disp Refills    TRI-LO-HAM 0.18/0.215/0.25 MG-25 MCG Oral Tab [Pharmacy Med Name: Tri-Lo-Ham 0.18/0.215/0.25 Mg-25 Mcg Tab Auro] 84 tablet 0     Sig: TAKE ONE TABLET BY MOUTH ONE TIME DAILY        Gynecology Medication Protocol Failed - 11/2/2021  1:30 AM        Failed - Pass dependent on manual look-up of last PAP and patient compliance with PAP follow up recommendations        Passed - Appointment in past 12 or next 3 months            Recent Outpatient Visits              1 month ago Vaginal discharge    Elidia Liu MD    Telemedicine    3 months ago [address], [address] [place] [address] Arias Oakley MD    Telemedicine    5 months ago [address] [address], [address] [place] [address] Jasmeet Barakat MD    Telemedicine    6 months ago [address], [address] [place] [address] Jasmeet Barakat MD    Telemedicine    11 months ago Annual physical exam    Elidia Liu MD    Office Visit

## 2021-11-03 RX ORDER — NORGESTIMATE AND ETHINYL ESTRADIOL 7DAYSX3 LO
1 KIT ORAL DAILY
Qty: 84 TABLET | OUTPATIENT
Start: 2021-11-03

## 2021-11-03 NOTE — TELEPHONE ENCOUNTER
Spoke with patient ( verified) and relayed Dr. Guy Beal message below--patient states she last picked up Tri-Lo-Elizabeth and would like to continue that prescription.     Medication pended as previously ordered 2021--please add refills, if appropriate    Pharmacy verified

## 2021-11-23 RX ORDER — NORGESTIMATE AND ETHINYL ESTRADIOL 7DAYSX3 LO
1 KIT ORAL DAILY
Qty: 84 TABLET | Refills: 1 | Status: SHIPPED | OUTPATIENT
Start: 2021-11-23 | End: 2022-06-03

## 2021-11-23 NOTE — TELEPHONE ENCOUNTER
Refill passed per [facility] protocol. THINPREP PAP WITH HPV REFLEX REQUEST: [id number]  Order: [id number]  Collected 12/4/2020 10:35 AM    Status: Final result    Visible to patient: Yes (seen)    Dx: Screening for cervical cancer      Component   Ref Range & Units    Interpretation/Result   Negative for intraepithelial lesion or malignancy      Other Findings   Fungal organisms morphologically consistent with Candida spp. Electronically signed by La Nena Fung on 12/11/2020 at 10:37 AM   Specimen Adequacy  Satisfactory for evaluation. No endocervical or metaplastic cells seen    General Categorization      Negative for intraepithelial lesion or malignancy           Requested Prescriptions   Pending Prescriptions Disp Refills    Norgestim-Eth Estrad Triphasic (TRI-LO-HAM) 0.18/0.215/0.25 MG-25 MCG Oral Tab 84 tablet 0     Sig: Take 1 tablet by mouth daily.         Gynecology Medication Protocol Failed - 11/23/2021  1:34 PM        Failed - Pass dependent on manual look-up of last PAP and patient compliance with PAP follow up recommendations        Passed - Appointment in past 12 or next 3 months                Recent Outpatient Visits              2 months ago Vaginal discharge    [facility], Roshni Macedo Maryagnes Pavy, MD    Telemedicine    4 months ago [address] [facility], aCsh Macedo MD    Telemedicine    6 months ago [facility]ammie Her[facility], Cash Macedo MD    Telemedicine    7 months ago Deidra Hollins MD    Telemedicine    11 months ago Annual physical exam    Quinton Reed MD    Office Visit

## 2021-12-29 ENCOUNTER — IMMUNIZATION (OUTPATIENT)
Dept: LAB | Facility: HOSPITAL | Age: 36
End: 2021-12-29
Attending: EMERGENCY MEDICINE
Payer: COMMERCIAL

## 2021-12-29 DIAGNOSIS — Z23 NEED FOR VACCINATION: Primary | ICD-10-CM

## 2021-12-29 PROCEDURE — 0064A SARSCOV2 VAC 50MCG/0.25ML IM: CPT

## 2022-01-17 RX ORDER — ACYCLOVIR 400 MG/1
400 TABLET ORAL AS NEEDED
Qty: 90 TABLET | Refills: 1 | OUTPATIENT
Start: 2022-01-17

## 2022-01-18 ENCOUNTER — TELEPHONE (OUTPATIENT)
Dept: INTERNAL MEDICINE CLINIC | Facility: CLINIC | Age: 37
End: 2022-01-18

## 2022-01-18 RX ORDER — ACYCLOVIR 400 MG/1
400 TABLET ORAL AS NEEDED
Qty: 90 TABLET | Refills: 1 | Status: SHIPPED | OUTPATIENT
Start: 2022-01-18 | End: 2022-01-19

## 2022-01-18 NOTE — TELEPHONE ENCOUNTER
Pharmacy is requesting clarification for the medication instructions for acyclovir. Per pharmacist, acyclovir is not normally taken as needed. Please advise.      acyclovir 400 MG Oral Tab 90 tablet 1 1/18/2022    Sig:   Take 1 tablet (400 mg total) by mout

## 2022-01-18 NOTE — TELEPHONE ENCOUNTER
Spoke to patient. She states valacyclovir is on her medication list she doesn't know why she needs to explain for a refill. Patient states she is currently having outbreak of genital herpes and is requesting refill of valacyclovir.  Refill pended for approv

## 2022-01-18 NOTE — TELEPHONE ENCOUNTER
Left message to call back. Transfer to triage      acyclovir 400 MG Oral Tab          Sig: Take 1 tablet (400 mg total) by mouth as needed.     Disp:  90 tablet    Refills:  1    Start: 1/17/2022    Class: Normal    Refused by: Sonia Goldstein RN

## 2022-01-19 RX ORDER — ACYCLOVIR 400 MG/1
400 TABLET ORAL 2 TIMES DAILY
Qty: 90 TABLET | Refills: 1 | Status: SHIPPED | OUTPATIENT
Start: 2022-01-19

## 2022-01-19 NOTE — TELEPHONE ENCOUNTER
Called pharmacy, confirmed name and . Confirmed that they received the updated script for Acyclovir. Pharmacist stated that it's been received and the patient will be contacted when it is ready.     Left message for patient to expect a call from the phar

## 2022-01-19 NOTE — TELEPHONE ENCOUNTER
Patient calling, confirmed name and . Patient has a herpes break-out and pharmacy will not dispense acyclovir as needed without a daily frequency included on the script. Dr. Apryl Lou, please advise and thank you.

## 2022-02-10 ENCOUNTER — OFFICE VISIT (OUTPATIENT)
Dept: INTERNAL MEDICINE CLINIC | Facility: CLINIC | Age: 37
End: 2022-02-10
Payer: COMMERCIAL

## 2022-02-10 VITALS
DIASTOLIC BLOOD PRESSURE: 81 MMHG | BODY MASS INDEX: 37.73 KG/M2 | WEIGHT: 221 LBS | HEART RATE: 85 BPM | SYSTOLIC BLOOD PRESSURE: 128 MMHG | HEIGHT: 64 IN | TEMPERATURE: 98 F

## 2022-02-10 DIAGNOSIS — L29.9 PRURITUS: ICD-10-CM

## 2022-02-10 DIAGNOSIS — N89.8 VAGINAL DISCHARGE: ICD-10-CM

## 2022-02-10 DIAGNOSIS — Z01.419 ENCOUNTER FOR WELL WOMAN EXAM WITH ROUTINE GYNECOLOGICAL EXAM: ICD-10-CM

## 2022-02-10 DIAGNOSIS — Z00.00 ANNUAL PHYSICAL EXAM: Primary | ICD-10-CM

## 2022-02-10 PROBLEM — E66.9 OBESITY (BMI 30-39.9): Status: ACTIVE | Noted: 2018-07-27

## 2022-02-10 PROBLEM — IMO0002 LGSIL (LOW GRADE SQUAMOUS INTRAEPITHELIAL DYSPLASIA): Status: ACTIVE | Noted: 2022-02-10

## 2022-02-10 LAB
CONTROL LINE PRESENT WITH A CLEAR BACKGROUND (YES/NO): YES YES/NO
PREGNANCY TEST, URINE: NEGATIVE

## 2022-02-10 PROCEDURE — 3074F SYST BP LT 130 MM HG: CPT | Performed by: INTERNAL MEDICINE

## 2022-02-10 PROCEDURE — 3079F DIAST BP 80-89 MM HG: CPT | Performed by: INTERNAL MEDICINE

## 2022-02-10 PROCEDURE — 3008F BODY MASS INDEX DOCD: CPT | Performed by: INTERNAL MEDICINE

## 2022-02-10 PROCEDURE — 81025 URINE PREGNANCY TEST: CPT | Performed by: INTERNAL MEDICINE

## 2022-02-10 PROCEDURE — 99395 PREV VISIT EST AGE 18-39: CPT | Performed by: INTERNAL MEDICINE

## 2022-02-11 ENCOUNTER — TELEPHONE (OUTPATIENT)
Dept: INTERNAL MEDICINE CLINIC | Facility: CLINIC | Age: 37
End: 2022-02-11

## 2022-02-11 RX ORDER — METRONIDAZOLE 500 MG/1
2000 TABLET ORAL ONCE
Qty: 4 TABLET | Refills: 0 | OUTPATIENT
Start: 2022-02-11 | End: 2022-02-11

## 2022-02-12 ENCOUNTER — LAB ENCOUNTER (OUTPATIENT)
Dept: LAB | Facility: HOSPITAL | Age: 37
End: 2022-02-12
Attending: INTERNAL MEDICINE
Payer: COMMERCIAL

## 2022-02-12 DIAGNOSIS — Z00.00 ANNUAL PHYSICAL EXAM: ICD-10-CM

## 2022-02-12 LAB
ALBUMIN SERPL-MCNC: 3.5 G/DL (ref 3.4–5)
ALBUMIN/GLOB SERPL: 0.9 {RATIO} (ref 1–2)
ALP LIVER SERPL-CCNC: 49 U/L
ALT SERPL-CCNC: 24 U/L
ANION GAP SERPL CALC-SCNC: 7 MMOL/L (ref 0–18)
AST SERPL-CCNC: 23 U/L (ref 15–37)
BASOPHILS # BLD AUTO: 0.03 X10(3) UL (ref 0–0.2)
BASOPHILS NFR BLD AUTO: 0.4 %
BILIRUB SERPL-MCNC: 0.3 MG/DL (ref 0.1–2)
BUN BLD-MCNC: 12 MG/DL (ref 7–18)
BUN/CREAT SERPL: 12.2 (ref 10–20)
CALCIUM BLD-MCNC: 9.3 MG/DL (ref 8.5–10.1)
CHLORIDE SERPL-SCNC: 108 MMOL/L (ref 98–112)
CHOLEST SERPL-MCNC: 169 MG/DL (ref ?–200)
CO2 SERPL-SCNC: 27 MMOL/L (ref 21–32)
CREAT BLD-MCNC: 0.98 MG/DL
DEPRECATED RDW RBC AUTO: 43.7 FL (ref 35.1–46.3)
EOSINOPHIL # BLD AUTO: 0.12 X10(3) UL (ref 0–0.7)
EOSINOPHIL NFR BLD AUTO: 1.7 %
ERYTHROCYTE [DISTWIDTH] IN BLOOD BY AUTOMATED COUNT: 13.7 % (ref 11–15)
EST. AVERAGE GLUCOSE BLD GHB EST-MCNC: 117 MG/DL (ref 68–126)
FASTING PATIENT LIPID ANSWER: YES
FASTING STATUS PATIENT QL REPORTED: YES
GLOBULIN PLAS-MCNC: 4.1 G/DL (ref 2.8–4.4)
GLUCOSE BLD-MCNC: 80 MG/DL (ref 70–99)
HBA1C MFR BLD: 5.7 % (ref ?–5.7)
HCT VFR BLD AUTO: 39.2 %
HDLC SERPL-MCNC: 47 MG/DL (ref 40–59)
HGB BLD-MCNC: 12.4 G/DL
IMM GRANULOCYTES # BLD AUTO: 0.01 X10(3) UL (ref 0–1)
IMM GRANULOCYTES NFR BLD: 0.1 %
LDLC SERPL CALC-MCNC: 105 MG/DL (ref ?–100)
LYMPHOCYTES # BLD AUTO: 3.09 X10(3) UL (ref 1–4)
LYMPHOCYTES NFR BLD AUTO: 43.5 %
MCH RBC QN AUTO: 27.6 PG (ref 26–34)
MCHC RBC AUTO-ENTMCNC: 31.6 G/DL (ref 31–37)
MCV RBC AUTO: 87.1 FL
MONOCYTES # BLD AUTO: 0.62 X10(3) UL (ref 0.1–1)
MONOCYTES NFR BLD AUTO: 8.7 %
NEUTROPHILS # BLD AUTO: 3.24 X10 (3) UL (ref 1.5–7.7)
NEUTROPHILS # BLD AUTO: 3.24 X10(3) UL (ref 1.5–7.7)
NEUTROPHILS NFR BLD AUTO: 45.6 %
NONHDLC SERPL-MCNC: 122 MG/DL (ref ?–130)
OSMOLALITY SERPL CALC.SUM OF ELEC: 293 MOSM/KG (ref 275–295)
PLATELET # BLD AUTO: 302 10(3)UL (ref 150–450)
POTASSIUM SERPL-SCNC: 4.4 MMOL/L (ref 3.5–5.1)
PROT SERPL-MCNC: 7.6 G/DL (ref 6.4–8.2)
RBC # BLD AUTO: 4.5 X10(6)UL
SODIUM SERPL-SCNC: 142 MMOL/L (ref 136–145)
TRICHOMONAS SCREEN: POSITIVE
TRIGL SERPL-MCNC: 93 MG/DL (ref 30–149)
TSI SER-ACNC: 1.25 MIU/ML (ref 0.36–3.74)
VLDLC SERPL CALC-MCNC: 16 MG/DL (ref 0–30)
WBC # BLD AUTO: 7.1 X10(3) UL (ref 4–11)

## 2022-02-12 PROCEDURE — 36415 COLL VENOUS BLD VENIPUNCTURE: CPT

## 2022-02-12 PROCEDURE — 84443 ASSAY THYROID STIM HORMONE: CPT

## 2022-02-12 PROCEDURE — 80053 COMPREHEN METABOLIC PANEL: CPT

## 2022-02-12 PROCEDURE — 80061 LIPID PANEL: CPT

## 2022-02-12 PROCEDURE — 83036 HEMOGLOBIN GLYCOSYLATED A1C: CPT

## 2022-02-12 PROCEDURE — 85025 COMPLETE CBC W/AUTO DIFF WBC: CPT

## 2022-04-15 RX ORDER — OMEPRAZOLE 20 MG/1
20 CAPSULE, DELAYED RELEASE ORAL
Qty: 90 CAPSULE | Refills: 1 | Status: SHIPPED | OUTPATIENT
Start: 2022-04-15 | End: 2023-04-05

## 2022-04-15 NOTE — TELEPHONE ENCOUNTER
Refill passed per [place] [facility] protocol.   Requested Prescriptions   Pending Prescriptions Disp Refills    OMEPRAZOLE 20 MG Oral Capsule Delayed Release [Pharmacy Med Name: Omeprazole Dr 20 Mg Cap Nort] 90 capsule 1     Sig: TAKE ONE CAPSULE (20 MG TOTAL) BY MOUTH EVERY MORNING BEFORE BREAKFAST        Gastrointestional Medication Protocol Passed - 4/15/2022  4:24 PM        Passed - Appointment in past 12 or next 3 months            Recent Outpatient Visits              2 months ago Annual physical exam    Nicolette Phillips MD    Office Visit    6 months ago Vaginal discharge    [place] [facility] [place], [facility], [place], [address] La Nena Horvath MD    Telemedicine    9 months ago [address], Roshni Macedo [place], MD    Telemedicine    10 months ago [employer], Roshni Macedo [place], MD    Telemedicine    11 months ago [employer], Fady Macedo MD    Telemedicine          Future Appointments         Provider Department Appt Notes    In 1 month Liz Martínez MD [facility], [address], [place] Referred by doctor to discuss period

## 2022-04-15 NOTE — TELEPHONE ENCOUNTER
Sent [facility] message asking if she is requesting medication. It shows that it was discontinued by a different provider.

## 2022-06-03 ENCOUNTER — OFFICE VISIT (OUTPATIENT)
Dept: OBGYN CLINIC | Facility: CLINIC | Age: 37
End: 2022-06-03
Payer: COMMERCIAL

## 2022-06-03 VITALS
BODY MASS INDEX: 38 KG/M2 | HEART RATE: 65 BPM | DIASTOLIC BLOOD PRESSURE: 88 MMHG | SYSTOLIC BLOOD PRESSURE: 139 MMHG | WEIGHT: 219 LBS

## 2022-06-03 DIAGNOSIS — Z30.09 BIRTH CONTROL COUNSELING: Primary | ICD-10-CM

## 2022-06-03 PROCEDURE — 99202 OFFICE O/P NEW SF 15 MIN: CPT | Performed by: OBSTETRICS & GYNECOLOGY

## 2022-06-03 PROCEDURE — 3075F SYST BP GE 130 - 139MM HG: CPT | Performed by: OBSTETRICS & GYNECOLOGY

## 2022-06-03 PROCEDURE — 3079F DIAST BP 80-89 MM HG: CPT | Performed by: OBSTETRICS & GYNECOLOGY

## 2022-06-20 ENCOUNTER — TELEPHONE (OUTPATIENT)
Dept: OBGYN CLINIC | Facility: CLINIC | Age: 37
End: 2022-06-20

## 2022-06-20 NOTE — TELEPHONE ENCOUNTER
ROSAURA Jones had [facility] consult with BLESSING 6/3/22. She decided on Depo? Is today day 1 of period?

## 2022-06-22 ENCOUNTER — NURSE ONLY (OUTPATIENT)
Dept: OBGYN CLINIC | Facility: CLINIC | Age: 37
End: 2022-06-22
Payer: COMMERCIAL

## 2022-06-22 VITALS
DIASTOLIC BLOOD PRESSURE: 85 MMHG | HEART RATE: 57 BPM | SYSTOLIC BLOOD PRESSURE: 132 MMHG | WEIGHT: 218 LBS | BODY MASS INDEX: 37 KG/M2

## 2022-06-22 DIAGNOSIS — Z30.42 DEPO-PROVERA CONTRACEPTIVE STATUS: Primary | ICD-10-CM

## 2022-06-22 PROCEDURE — 96372 THER/PROPH/DIAG INJ SC/IM: CPT | Performed by: OBSTETRICS & GYNECOLOGY

## 2022-06-22 RX ORDER — MEDROXYPROGESTERONE ACETATE 150 MG/ML
150 INJECTION, SUSPENSION INTRAMUSCULAR
Status: SHIPPED | OUTPATIENT
Start: 2022-06-22

## 2022-06-22 RX ADMIN — MEDROXYPROGESTERONE ACETATE 150 MG: 150 INJECTION, SUSPENSION INTRAMUSCULAR at 11:03:00

## 2022-06-22 NOTE — PROGRESS NOTES
PT IS HERE TODAY FOR FIRST DEPO PROVERA INJECTION,FIRST DAY OF LMP WAS 6/20/22,PT IS WITHOUT ANY COMPLAINT, NEXT INJECTION WILL BE DUE BETWEEN 9/7/22 AND 9/21/22.

## 2022-09-07 ENCOUNTER — NURSE ONLY (OUTPATIENT)
Dept: OBGYN CLINIC | Facility: CLINIC | Age: 37
End: 2022-09-07
Payer: COMMERCIAL

## 2022-09-07 VITALS — DIASTOLIC BLOOD PRESSURE: 87 MMHG | SYSTOLIC BLOOD PRESSURE: 131 MMHG | HEART RATE: 73 BPM

## 2022-09-07 DIAGNOSIS — Z30.42 DEPO-PROVERA CONTRACEPTIVE STATUS: Primary | ICD-10-CM

## 2022-09-07 PROCEDURE — 96372 THER/PROPH/DIAG INJ SC/IM: CPT | Performed by: OBSTETRICS & GYNECOLOGY

## 2022-09-07 RX ADMIN — MEDROXYPROGESTERONE ACETATE 150 MG: 150 INJECTION, SUSPENSION INTRAMUSCULAR at 10:10:00

## 2022-09-07 NOTE — PROGRESS NOTES
depo administered to pts left deltoid. pt tolerated injection well. Pt provided with next depo window of Nov 23-Dec 7.  Encouraged pt to schedule annual exam.

## 2022-09-27 RX ORDER — FAMOTIDINE 20 MG/1
20 TABLET, FILM COATED ORAL 2 TIMES DAILY
Qty: 180 TABLET | Refills: 1 | Status: SHIPPED | OUTPATIENT
Start: 2022-09-27 | End: 2023-09-03

## 2022-09-27 NOTE — TELEPHONE ENCOUNTER
Refill passed per [address] protocol.   Pt to confirm if she is taking famotidine and omeprazole  Requested Prescriptions   Pending Prescriptions Disp Refills    FAMOTIDINE 20 MG Oral Tab [Pharmacy Med Name: Famotidine 20 Mg Tab Teva] 180 tablet 0     Sig: TAKE ONE TABLET BY MOUTH TWICE DAILY        Gastrointestional Medication Protocol Passed - 9/27/2022 10:42 AM        Passed - In person appointment or virtual visit in the past 12 mos or appointment in next 3 mos       Recent Outpatient Visits              2 weeks ago Depo-Provera contraceptive status    [facility], [address], [place]    Nurse Only    3 months ago Depo-Provera contraceptive status    [facility], [address], [place]    Nurse Only    3 months ago Birth control counseling    [facility], [address], Ana Lilia Dale MD    Office Visit    7 months ago Annual physical exam    Raphael Santos MD    Office Visit    1 year ago Vaginal discharge    [address], Roshni Macedo Austin, MD    Telemedicine     Future Appointments         Provider Department Appt Notes    In 1 month [address] [facility], [address]  11/23-12/07/2022    In 3 months Steven Puentes MD [facility], [address], [place] annual                     Recent Outpatient Visits              2 weeks ago Depo-Provera contraceptive status    [facility], [address], [place]    Nurse Only    3 months ago Depo-Provera contraceptive status    [facility], [address], [place]    Nurse Only    3 months ago Birth control counseling    [facility] Alissa Rueda MD    Office Visit    7 months ago Annual physical exam    Donna Vela MD    Office Visit    1 year ago Vaginal discharge    [address], [facility] Juan Pablo Bynum MD    Telemedicine            Future Appointments         Provider Department Appt Notes    In 1 month [address] [facility], [address] [facility] depo  11/23-12/07/2022    In 3 months Jennifer Cotter MD [facility], [address] [facility],[address], [facility]

## 2022-11-25 ENCOUNTER — NURSE ONLY (OUTPATIENT)
Dept: OBGYN CLINIC | Facility: CLINIC | Age: 37
End: 2022-11-25
Payer: COMMERCIAL

## 2022-11-25 VITALS
HEART RATE: 76 BPM | WEIGHT: 211.19 LBS | DIASTOLIC BLOOD PRESSURE: 81 MMHG | SYSTOLIC BLOOD PRESSURE: 137 MMHG | BODY MASS INDEX: 36 KG/M2

## 2022-11-25 DIAGNOSIS — Z30.42 DEPO-PROVERA CONTRACEPTIVE STATUS: Primary | ICD-10-CM

## 2022-11-25 PROCEDURE — 96372 THER/PROPH/DIAG INJ SC/IM: CPT | Performed by: OBSTETRICS & GYNECOLOGY

## 2022-11-25 RX ADMIN — MEDROXYPROGESTERONE ACETATE 150 MG: 150 INJECTION, SUSPENSION INTRAMUSCULAR at 09:14:00

## 2022-11-25 NOTE — PROGRESS NOTES
Patient here today for Depo Provera injection. Pt states she had spotting on Tuesday and still was having some bleeding today. Pt was told it can be breakthrough bleeding and if it continues after depo today , to give the office a call. Injection given on the Right deltoid. patient tolerated well. Patient given written return instruction for February 10 - 24 2023  for next injection. Patient has next annual scheduled for 1/11/23 with Johanna Fan [id number]. Patient verbalized understanding.

## 2023-01-09 ENCOUNTER — IMMUNIZATION (OUTPATIENT)
Dept: LAB | Age: 38
End: 2023-01-09
Attending: EMERGENCY MEDICINE
Payer: COMMERCIAL

## 2023-01-09 DIAGNOSIS — Z23 NEED FOR VACCINATION: Primary | ICD-10-CM

## 2023-01-09 PROCEDURE — 90471 IMMUNIZATION ADMIN: CPT

## 2023-01-09 PROCEDURE — 90686 IIV4 VACC NO PRSV 0.5 ML IM: CPT

## 2023-02-02 ENCOUNTER — TELEPHONE (OUTPATIENT)
Dept: OBGYN CLINIC | Facility: CLINIC | Age: 38
End: 2023-02-02

## 2023-02-02 ENCOUNTER — OFFICE VISIT (OUTPATIENT)
Dept: INTERNAL MEDICINE CLINIC | Facility: CLINIC | Age: 38
End: 2023-02-02

## 2023-02-02 VITALS
TEMPERATURE: 98 F | OXYGEN SATURATION: 100 % | DIASTOLIC BLOOD PRESSURE: 83 MMHG | SYSTOLIC BLOOD PRESSURE: 126 MMHG | BODY MASS INDEX: 35.68 KG/M2 | HEART RATE: 80 BPM | HEIGHT: 64 IN | WEIGHT: 209 LBS

## 2023-02-02 DIAGNOSIS — M54.40 ACUTE RIGHT-SIDED LOW BACK PAIN WITH SCIATICA, SCIATICA LATERALITY UNSPECIFIED: Primary | ICD-10-CM

## 2023-02-02 PROCEDURE — 99214 OFFICE O/P EST MOD 30 MIN: CPT | Performed by: INTERNAL MEDICINE

## 2023-02-02 PROCEDURE — 3079F DIAST BP 80-89 MM HG: CPT | Performed by: INTERNAL MEDICINE

## 2023-02-02 PROCEDURE — 3008F BODY MASS INDEX DOCD: CPT | Performed by: INTERNAL MEDICINE

## 2023-02-02 PROCEDURE — 3074F SYST BP LT 130 MM HG: CPT | Performed by: INTERNAL MEDICINE

## 2023-02-02 RX ORDER — CYCLOBENZAPRINE HCL 5 MG
5 TABLET ORAL NIGHTLY PRN
Qty: 20 TABLET | Refills: 0 | Status: SHIPPED | OUTPATIENT
Start: 2023-02-02

## 2023-02-02 NOTE — TELEPHONE ENCOUNTER
Pt is on Depo since 6/2022, reports she is having monthly periods and light pink spotting for 2-3 days in between periods. Pt does not want to have periods and spotting. states she was on Depo in the past and did not have periods or spotting. Pt asking if dose needs to be increased or any other recs. Pt is not interested in switching BC method. All doses have been given within window on 6/22/22, 9/7/22, 11/25/22. Next window is 2/10- 2/24/23. To ADRYANK to please advise.

## 2023-02-02 NOTE — TELEPHONE ENCOUNTER
Since she restarted the depo provera, it may take time before she has no period. See how things are for next 2 shots.   Can also discuss more at annual

## 2023-02-02 NOTE — TELEPHONE ENCOUNTER
Patient continues to have spotting despite getting depo injections. Would like to know if the dosage needs to be increased or if a different birth control would be a better option. Please advise.

## 2023-02-16 ENCOUNTER — NURSE ONLY (OUTPATIENT)
Dept: OBGYN CLINIC | Facility: CLINIC | Age: 38
End: 2023-02-16

## 2023-02-16 VITALS
BODY MASS INDEX: 36 KG/M2 | WEIGHT: 210 LBS | DIASTOLIC BLOOD PRESSURE: 80 MMHG | SYSTOLIC BLOOD PRESSURE: 122 MMHG | HEART RATE: 69 BPM

## 2023-02-16 DIAGNOSIS — Z30.42 DEPO-PROVERA CONTRACEPTIVE STATUS: Primary | ICD-10-CM

## 2023-02-16 PROCEDURE — 96372 THER/PROPH/DIAG INJ SC/IM: CPT | Performed by: OBSTETRICS & GYNECOLOGY

## 2023-02-16 RX ADMIN — MEDROXYPROGESTERONE ACETATE 150 MG: 150 INJECTION, SUSPENSION INTRAMUSCULAR at 10:10:00

## 2023-02-16 NOTE — PROGRESS NOTES
Patient here today for Depo Provera injection. Patient's has annual scheduled for 6/1/23 with JLK Injection given on the Left Deltoid . patient tolerated well. Patient given written return instruction for May 4 - 18 2023  for next injection. Patient verbalized understanding.

## 2023-03-16 ENCOUNTER — TELEPHONE (OUTPATIENT)
Dept: OBGYN CLINIC | Facility: CLINIC | Age: 38
End: 2023-03-16

## 2023-03-16 NOTE — TELEPHONE ENCOUNTER
It is very variable in terms of how long it will take before pt will not get a period on depo. If pt wants to stop depo, what does she plan to use for birth control?   Ideally it should be started during the depo window if she wants to be protected from pregnancy

## 2023-03-16 NOTE — TELEPHONE ENCOUNTER
Patient notified of BLESSING's message below. She states she will consider continuing to monitor bleeding on depo, but would like to know what next step would be if she continued to bleed daily for another 2 weeks?    To BLESSING

## 2023-03-16 NOTE — TELEPHONE ENCOUNTER
Patient wants the nurse to know that she continues to have periods. Patient wants to know if she will need to come in to get checked?

## 2023-03-16 NOTE — TELEPHONE ENCOUNTER
Pt states she started Depo in June 2022 to stop periods and she has been having irregular bleeding since she started. States having light bleeding now since the first week of March. Changes a pad for hygiene every time she potties and not because it is saturated. Denies SOB, lightheadedness. States sometimes feels tired. See 2/2/23, informed pt per [facility] plan is to continue with 2 more depo injections and then f/u at annual in June. Last depo injection was 2/16/23. Pt states she does not want to continue with Depo. Offered pt appt, pt declined. States she just wants JLK to know about the bleeding. Also reports since she started Depo the irregular bleeding was a medium flow and this time it is a lighter flow. Informed pt of possibility that the bleeding will trend down since it is lighter. Pt has annual scheduled 6/1/23. Message to [facility] to advise if recs remain to to do one more depo?

## 2023-04-04 NOTE — TELEPHONE ENCOUNTER
Dr. Lennox Fisherman - unable to sign due to Alert Message (Duplicate Therapy)    But patient confirms she takes Omeprazole once daily and famotidine Twice daily (this was noted in your last office visit note as well)

## 2023-04-04 NOTE — TELEPHONE ENCOUNTER
Refill passed per [employer] [employer], [facility] protocol. Duplicate therapy with famotidine and omeprazole.    Requested Prescriptions   Pending Prescriptions Disp Refills    OMEPRAZOLE 20 MG Oral Capsule Delayed Release [Pharmacy Med Name: Omeprazole Dr 20 Mg Cap Nort] 90 capsule 0     Sig: TAKE ONE CAPSULE BY MOUTH ONE TIME DAILY BEFORE BREAKFAST       Gastrointestional Medication Protocol Passed - 4/4/2023  1:30 AM        Passed - In person appointment or virtual visit in the past 12 mos or appointment in next 3 mos     Recent Outpatient Visits              1 month ago Depo-Provera contraceptive status    [facility], [address], [facility] - OB/GYN    Nurse Only    2 months ago Acute right-sided low back pain with sciatica, sciatica laterality unspecified    [address], Evangelist Robertson MD    Office Visit    4 months ago Depo-Provera contraceptive status    [address], [address]    Nurse Only    6 months ago Depo-Provera contraceptive status    [address], [address], [address]    Nurse Only    9 months ago Depo-Provera contraceptive status    [address], [address], [address]    Nurse Only          Future Appointments         Provider Department Appt Notes    In 1 month Padmini Castano MD [address], [address], [address] Physical                    Recent Outpatient Visits              1 month ago Depo-Provera contraceptive status    [address], [address], [address]    Nurse Only    2 months ago Acute right-sided low back pain with sciatica, sciatica laterality unspecified    [address], Evangelist Robertson MD    Office Visit    4 months ago Depo-Provera contraceptive status    [address]    Nurse Only    6 months ago Depo-Provera contraceptive status    [facility], [address] [facility]    Nurse Only    9 months ago Depo-Provera contraceptive status    [address], [address], [address] [facility]    Nurse Only            Future Appointments         Provider Department Appt Notes    In 1 month Mary Reddy MD [address], [address], [address] [facility] [address] Physical

## 2023-04-05 RX ORDER — OMEPRAZOLE 20 MG/1
20 CAPSULE, DELAYED RELEASE ORAL
Qty: 90 CAPSULE | Refills: 3 | Status: SHIPPED | OUTPATIENT
Start: 2023-04-05

## 2023-05-14 ENCOUNTER — HOSPITAL ENCOUNTER (OUTPATIENT)
Age: 38
Discharge: HOME | End: 2023-05-14
Attending: EMERGENCY MEDICINE
Payer: COMMERCIAL

## 2023-05-14 ENCOUNTER — APPOINTMENT (OUTPATIENT)
Dept: GENERAL RADIOLOGY | Age: 38
End: 2023-05-14
Attending: EMERGENCY MEDICINE
Payer: COMMERCIAL

## 2023-05-14 VITALS
RESPIRATION RATE: 16 BRPM | DIASTOLIC BLOOD PRESSURE: 91 MMHG | TEMPERATURE: 97 F | SYSTOLIC BLOOD PRESSURE: 134 MMHG | HEART RATE: 77 BPM | WEIGHT: 220 LBS | OXYGEN SATURATION: 99 % | BODY MASS INDEX: 38 KG/M2

## 2023-05-14 DIAGNOSIS — M54.16 LUMBAR RADICULOPATHY: Primary | ICD-10-CM

## 2023-05-14 PROCEDURE — 99204 OFFICE O/P NEW MOD 45 MIN: CPT

## 2023-05-14 PROCEDURE — 72100 X-RAY EXAM L-S SPINE 2/3 VWS: CPT | Performed by: EMERGENCY MEDICINE

## 2023-05-14 PROCEDURE — 99213 OFFICE O/P EST LOW 20 MIN: CPT

## 2023-05-14 RX ORDER — METHYLPREDNISOLONE 4 MG/1
TABLET ORAL
Qty: 1 EACH | Refills: 0 | Status: SHIPPED | OUTPATIENT
Start: 2023-05-14

## 2023-05-14 RX ORDER — DIAZEPAM 5 MG/1
5 TABLET ORAL 3 TIMES DAILY PRN
Qty: 20 TABLET | Refills: 0 | Status: SHIPPED | OUTPATIENT
Start: 2023-05-14 | End: 2023-05-21

## 2023-05-14 NOTE — DISCHARGE INSTRUCTIONS
Apply ice 20 minutes 4 times a day Tylenol or Advil as needed. Take the diazepam for more severe pain or muscle spasm this will make you drowsy do not drink or drive while taking it. Take the Medrol Dosepak as prescribed follow-up with your doctor for next 2 days if no improvement consider physical therapy and/or referral to see spine physician.

## 2023-05-14 NOTE — ED INITIAL ASSESSMENT (HPI)
Back pain -started 6 months ago. Wednesday pain got worse could not bend or sit up. . right side back pain radiating to right leg. Denies any numbness or tingling.  Takes aleve, last dose at 530 am. Took leftover muscle relaxer Thursday but no relief

## 2023-06-01 ENCOUNTER — OFFICE VISIT (OUTPATIENT)
Dept: OBGYN CLINIC | Facility: CLINIC | Age: 38
End: 2023-06-01

## 2023-06-01 VITALS
BODY MASS INDEX: 35 KG/M2 | SYSTOLIC BLOOD PRESSURE: 125 MMHG | WEIGHT: 204.19 LBS | HEART RATE: 70 BPM | DIASTOLIC BLOOD PRESSURE: 87 MMHG

## 2023-06-01 DIAGNOSIS — Z01.419 ENCOUNTER FOR GYNECOLOGICAL EXAMINATION: Primary | ICD-10-CM

## 2023-06-01 PROCEDURE — 3074F SYST BP LT 130 MM HG: CPT | Performed by: OBSTETRICS & GYNECOLOGY

## 2023-06-01 PROCEDURE — 99395 PREV VISIT EST AGE 18-39: CPT | Performed by: OBSTETRICS & GYNECOLOGY

## 2023-06-01 PROCEDURE — 3079F DIAST BP 80-89 MM HG: CPT | Performed by: OBSTETRICS & GYNECOLOGY

## 2023-06-01 RX ORDER — NORETHINDRONE ACETATE AND ETHINYL ESTRADIOL 1.5-30(21)
1 KIT ORAL DAILY
Qty: 84 TABLET | Refills: 3 | Status: SHIPPED | OUTPATIENT
Start: 2023-06-01

## 2023-06-02 ENCOUNTER — OFFICE VISIT (OUTPATIENT)
Dept: INTERNAL MEDICINE CLINIC | Facility: CLINIC | Age: 38
End: 2023-06-02

## 2023-06-02 ENCOUNTER — LAB ENCOUNTER (OUTPATIENT)
Dept: LAB | Age: 38
End: 2023-06-02
Attending: INTERNAL MEDICINE
Payer: COMMERCIAL

## 2023-06-02 VITALS
HEIGHT: 64 IN | DIASTOLIC BLOOD PRESSURE: 80 MMHG | OXYGEN SATURATION: 100 % | HEART RATE: 89 BPM | WEIGHT: 206 LBS | SYSTOLIC BLOOD PRESSURE: 132 MMHG | TEMPERATURE: 98 F | BODY MASS INDEX: 35.17 KG/M2

## 2023-06-02 DIAGNOSIS — Z00.00 ANNUAL PHYSICAL EXAM: Primary | ICD-10-CM

## 2023-06-02 DIAGNOSIS — Z00.00 ANNUAL PHYSICAL EXAM: ICD-10-CM

## 2023-06-02 LAB
ALBUMIN SERPL-MCNC: 3.4 G/DL (ref 3.4–5)
ALBUMIN/GLOB SERPL: 0.8 {RATIO} (ref 1–2)
ALP LIVER SERPL-CCNC: 50 U/L
ALT SERPL-CCNC: 23 U/L
ANION GAP SERPL CALC-SCNC: 7 MMOL/L (ref 0–18)
AST SERPL-CCNC: 15 U/L (ref 15–37)
BASOPHILS # BLD AUTO: 0.02 X10(3) UL (ref 0–0.2)
BASOPHILS NFR BLD AUTO: 0.4 %
BILIRUB SERPL-MCNC: 0.5 MG/DL (ref 0.1–2)
BUN BLD-MCNC: 14 MG/DL (ref 7–18)
BUN/CREAT SERPL: 11.9 (ref 10–20)
CALCIUM BLD-MCNC: 9.2 MG/DL (ref 8.5–10.1)
CHLORIDE SERPL-SCNC: 109 MMOL/L (ref 98–112)
CHOLEST SERPL-MCNC: 151 MG/DL (ref ?–200)
CO2 SERPL-SCNC: 23 MMOL/L (ref 21–32)
CREAT BLD-MCNC: 1.18 MG/DL
DEPRECATED RDW RBC AUTO: 44.8 FL (ref 35.1–46.3)
EOSINOPHIL # BLD AUTO: 0.09 X10(3) UL (ref 0–0.7)
EOSINOPHIL NFR BLD AUTO: 1.7 %
ERYTHROCYTE [DISTWIDTH] IN BLOOD BY AUTOMATED COUNT: 14.6 % (ref 11–15)
FASTING PATIENT LIPID ANSWER: NO
FASTING STATUS PATIENT QL REPORTED: NO
GFR SERPLBLD BASED ON 1.73 SQ M-ARVRAT: 61 ML/MIN/1.73M2 (ref 60–?)
GLOBULIN PLAS-MCNC: 4.2 G/DL (ref 2.8–4.4)
GLUCOSE BLD-MCNC: 116 MG/DL (ref 70–99)
HCT VFR BLD AUTO: 40.6 %
HDLC SERPL-MCNC: 42 MG/DL (ref 40–59)
HGB BLD-MCNC: 12.6 G/DL
HPV I/H RISK 1 DNA SPEC QL NAA+PROBE: NEGATIVE
IMM GRANULOCYTES # BLD AUTO: 0.01 X10(3) UL (ref 0–1)
IMM GRANULOCYTES NFR BLD: 0.2 %
LDLC SERPL CALC-MCNC: 93 MG/DL (ref ?–100)
LYMPHOCYTES # BLD AUTO: 2.87 X10(3) UL (ref 1–4)
LYMPHOCYTES NFR BLD AUTO: 53.7 %
MCH RBC QN AUTO: 26.1 PG (ref 26–34)
MCHC RBC AUTO-ENTMCNC: 31 G/DL (ref 31–37)
MCV RBC AUTO: 84.1 FL
MONOCYTES # BLD AUTO: 0.47 X10(3) UL (ref 0.1–1)
MONOCYTES NFR BLD AUTO: 8.8 %
NEUTROPHILS # BLD AUTO: 1.88 X10 (3) UL (ref 1.5–7.7)
NEUTROPHILS # BLD AUTO: 1.88 X10(3) UL (ref 1.5–7.7)
NEUTROPHILS NFR BLD AUTO: 35.2 %
NONHDLC SERPL-MCNC: 109 MG/DL (ref ?–130)
OSMOLALITY SERPL CALC.SUM OF ELEC: 289 MOSM/KG (ref 275–295)
PLATELET # BLD AUTO: 234 10(3)UL (ref 150–450)
POTASSIUM SERPL-SCNC: 3.4 MMOL/L (ref 3.5–5.1)
PROT SERPL-MCNC: 7.6 G/DL (ref 6.4–8.2)
RBC # BLD AUTO: 4.83 X10(6)UL
SODIUM SERPL-SCNC: 139 MMOL/L (ref 136–145)
TRIGL SERPL-MCNC: 83 MG/DL (ref 30–149)
TSI SER-ACNC: 0.97 MIU/ML (ref 0.36–3.74)
VLDLC SERPL CALC-MCNC: 13 MG/DL (ref 0–30)
WBC # BLD AUTO: 5.3 X10(3) UL (ref 4–11)

## 2023-06-02 PROCEDURE — 3075F SYST BP GE 130 - 139MM HG: CPT | Performed by: INTERNAL MEDICINE

## 2023-06-02 PROCEDURE — 80061 LIPID PANEL: CPT

## 2023-06-02 PROCEDURE — 99395 PREV VISIT EST AGE 18-39: CPT | Performed by: INTERNAL MEDICINE

## 2023-06-02 PROCEDURE — 3008F BODY MASS INDEX DOCD: CPT | Performed by: INTERNAL MEDICINE

## 2023-06-02 PROCEDURE — 84443 ASSAY THYROID STIM HORMONE: CPT

## 2023-06-02 PROCEDURE — 80053 COMPREHEN METABOLIC PANEL: CPT

## 2023-06-02 PROCEDURE — 85025 COMPLETE CBC W/AUTO DIFF WBC: CPT

## 2023-06-02 PROCEDURE — 3079F DIAST BP 80-89 MM HG: CPT | Performed by: INTERNAL MEDICINE

## 2023-06-02 PROCEDURE — 36415 COLL VENOUS BLD VENIPUNCTURE: CPT

## 2023-06-05 NOTE — PROGRESS NOTES
Patient informed that her labs are okay except her creatinine is slightly up drink more fluids and avoid nephrotoxic drugs ibuprofen Aleve, also her potassium is slightly eat more pottasium rich food, like bananas.

## 2023-07-19 RX ORDER — ACYCLOVIR 400 MG/1
400 TABLET ORAL 2 TIMES DAILY
Qty: 90 TABLET | Refills: 1 | OUTPATIENT
Start: 2023-07-19

## 2023-07-19 RX ORDER — ACYCLOVIR 400 MG/1
400 TABLET ORAL 2 TIMES DAILY
Qty: 90 TABLET | Refills: 0 | Status: SHIPPED | OUTPATIENT
Start: 2023-07-19

## 2023-07-19 NOTE — TELEPHONE ENCOUNTER
Please review. Protocol failed/ No protocol      Requested Prescriptions   Pending Prescriptions Disp Refills    ACYCLOVIR 400 MG Oral Tab [Pharmacy Med Name: Acyclovir 400 Mg Tab Zen] 90 tablet 0     Sig: TAKE ONE TABLET BY MOUTH TWICE DAILY       There is no refill protocol information for this order               Recent Outpatient Visits              1 month ago Annual physical exam    [address], Daryle Davis, MD    Office Visit    1 month ago Encounter for gynecological examination    [facility], [address] Jett Gomez MD    Office Visit    5 months ago Depo-Provera contraceptive status    [address], [address], [address] [facility]    Nurse Only    5 months ago Acute right-sided low back pain with sciatica, sciatica laterality unspecified    [address], Daryle Davis, MD    Office Visit    7 months ago Depo-Provera contraceptive status    [address], [address] [facility]    Nurse Only

## 2023-07-20 NOTE — TELEPHONE ENCOUNTER
Duplicate request, previously addressed. Disp Refills Start End     acyclovir 400 MG Oral Tab 90 tablet 0 7/19/2023     Sig - Route:  Take 1 tablet (400 mg total) by mouth 2 (two) times daily. - Oral    Sent to pharmacy as: Acyclovir 400 MG Oral Tablet (Zovirax)    E-Prescribing Status: Receipt confirmed by pharmacy (7/19/2023  5:22 PM CDT)    Renewals    Renewal provider: Nolvia Phillips, [address], [address] [place] [address] [address] [phone], [phone]

## 2023-09-03 RX ORDER — FAMOTIDINE 20 MG/1
20 TABLET, FILM COATED ORAL 2 TIMES DAILY
Qty: 180 TABLET | Refills: 3 | Status: SHIPPED | OUTPATIENT
Start: 2023-09-03

## 2024-06-14 DIAGNOSIS — Z76.0 MEDICATION REFILL: Primary | ICD-10-CM

## 2024-06-14 RX ORDER — NORETHINDRONE ACETATE AND ETHINYL ESTRADIOL 1.5-30(21)
1 KIT ORAL DAILY
Qty: 84 TABLET | Refills: 1 | Status: SHIPPED | OUTPATIENT
Start: 2024-06-14

## 2024-06-14 NOTE — TELEPHONE ENCOUNTER
Last annual: 6/1/2023 Dr. Ramirez  Last Pap: 6/1/2023 Dr. Ramirez  Next annual with Dr. Ramirez 10/2024    Oral contraceptive's sent to cover until next annual per protocol

## 2024-08-23 ENCOUNTER — OFFICE VISIT (OUTPATIENT)
Dept: INTERNAL MEDICINE CLINIC | Facility: CLINIC | Age: 39
End: 2024-08-23

## 2024-08-23 VITALS
HEART RATE: 70 BPM | SYSTOLIC BLOOD PRESSURE: 130 MMHG | HEIGHT: 64 IN | WEIGHT: 193 LBS | OXYGEN SATURATION: 100 % | TEMPERATURE: 98 F | BODY MASS INDEX: 32.95 KG/M2 | DIASTOLIC BLOOD PRESSURE: 72 MMHG

## 2024-08-23 DIAGNOSIS — E55.9 VITAMIN D DEFICIENCY: ICD-10-CM

## 2024-08-23 DIAGNOSIS — E53.8 B12 DEFICIENCY: ICD-10-CM

## 2024-08-23 DIAGNOSIS — Z00.00 ANNUAL PHYSICAL EXAM: Primary | ICD-10-CM

## 2024-08-23 PROCEDURE — 3075F SYST BP GE 130 - 139MM HG: CPT | Performed by: INTERNAL MEDICINE

## 2024-08-23 PROCEDURE — 3008F BODY MASS INDEX DOCD: CPT | Performed by: INTERNAL MEDICINE

## 2024-08-23 PROCEDURE — 3078F DIAST BP <80 MM HG: CPT | Performed by: INTERNAL MEDICINE

## 2024-08-23 PROCEDURE — 99395 PREV VISIT EST AGE 18-39: CPT | Performed by: INTERNAL MEDICINE

## 2024-08-23 RX ORDER — CITALOPRAM HYDROBROMIDE 10 MG/1
10 TABLET ORAL DAILY
Qty: 90 TABLET | Refills: 0 | Status: SHIPPED | OUTPATIENT
Start: 2024-08-23

## 2024-08-23 NOTE — PROGRESS NOTES
HPI:   Le Diez is a 38 year old female who presents for a complete physical exam.  Her period is regular  She is also  requesting to resume her anxiety medication  Wt Readings from Last 3 Encounters:   08/23/24 193 lb (87.5 kg)   06/02/23 206 lb (93.4 kg)   06/01/23 204 lb 3.2 oz (92.6 kg)     Body mass index is 33.13 kg/m².     Cholesterol, Total (mg/dL)   Date Value   06/02/2023 151   02/12/2022 169   12/04/2020 145     HDL Cholesterol (mg/dL)   Date Value   06/02/2023 42   02/12/2022 47   12/04/2020 45     LDL Cholesterol (mg/dL)   Date Value   06/02/2023 93   02/12/2022 105 (H)   12/04/2020 70     AST (U/L)   Date Value   06/02/2023 15   02/12/2022 23   12/04/2020 12 (L)     ALT (U/L)   Date Value   06/02/2023 23   02/12/2022 24   12/04/2020 20        Current Outpatient Medications   Medication Sig Dispense Refill    Norethin Ace-Eth Estrad-FE (BLAIR FE 1.5/30) 1.5-30 MG-MCG Oral Tab Take 1 tablet by mouth daily. 84 tablet 1    acyclovir 400 MG Oral Tab Take 1 tablet (400 mg total) by mouth 2 (two) times daily. 90 tablet 0    omeprazole 20 MG Oral Capsule Delayed Release Take 1 capsule (20 mg total) by mouth before breakfast. 90 capsule 3    famotidine 20 MG Oral Tab Take 1 tablet (20 mg total) by mouth 2 (two) times daily. (Patient not taking: Reported on 8/23/2024) 180 tablet 3    cyclobenzaprine 5 MG Oral Tab Take 1 tablet (5 mg total) by mouth nightly as needed for Muscle spasms. (Patient not taking: Reported on 8/23/2024) 20 tablet 0    Citalopram Hydrobromide 10 MG Oral Tab Take 1 tablet (10 mg total) by mouth daily. (Patient not taking: Reported on 8/23/2024) 90 tablet 0      Past Medical History:    Anxiety    Borderline diabetes    Depression    Panic attacks      History reviewed. No pertinent surgical history.   Family History   Problem Relation Age of Onset    Hypertension Mother     Other (hypercholesterolemia) Mother     Other (osteopenia) Mother     Hypertension Maternal Grandmother      Hypertension Maternal Grandfather     Heart Disease Maternal Grandfather     Diabetes Maternal Grandfather     Breast Cancer Maternal Great-Grandparent         mat great aunt unknown age    Diabetes Father       Social History:   Social History     Socioeconomic History    Marital status: Single   Tobacco Use    Smoking status: Never    Smokeless tobacco: Never   Substance and Sexual Activity    Alcohol use: Yes     Comment: socially    Drug use: No    Sexual activity: Not Currently     Partners: Male     Birth control/protection: Depo Provera     Social Determinants of Health      Received from [facility], [facility]    Social Connections    Received from [facility], [facility]    Housing Stability     Exercise: none.  Diet: watches calories closely     REVIEW OF SYSTEMS:     Constitutional Negative Chills, fatigue and fever.   ENMT Negative Hearing loss, nasal drainage, pain in/around ear, sinus pressure and sore throat.   Eyes Negative Eye pain and vision changes.   Respiratory Negative Cough, dyspnea and wheezing.   Cardio Negative Chest pain, claudication, edema and irregular heartbeat/palpitations.   GI Negative Abdominal pain, blood in stool, constipation, diarrhea, heartburn, nausea and vomiting.    Negative Dysuria, hematuria, urinary incontinence. Menses regular, not heavy.   Endocrine Negative Cold intolerance and heat intolerance.   Neuro Negative Dizziness, extremity weakness, headache, memory impairment, numbness in extremities, seizures and tremors.   Psych Negative Anxiety, depression and insomnia.   Integumentary Negative Breast discharge, breast lump(s), hair loss and rash.   MS Negative Back pain and joint pain.   Hema/Lymph Negative Easy bleeding, easy bruising and thromboembolic events.   Allergic/Immuno Negative Environmental allergies, food allergies and seasonal allergies.         EXAM:   /72 (BP Location:  Left arm, Patient Position: Sitting, Cuff Size: adult)   Pulse 70   Temp 98.4 °F (36.9 °C) (Temporal)   Ht 5' 4\" (1.626 m)   Wt 193 lb (87.5 kg)   LMP 08/21/2024 (Approximate)   SpO2 100%   BMI 33.13 kg/m²   Body mass index is 33.13 kg/m².     Constitutional Normal Well developed.   Eyes Normal Pupil - Right: Normal, Left: Normal.   Ears Normal External - normal. Canal. TM - Normal bilaterally  Hearing - grossly normal   Nasopharynx Normal External nose - Normal. Lips/teeth/gums - Normal. Oropharynx - clear no erythema or exudate   Neck Exam Normal Palpation - Normal. No thyromegaly or lymphadenopathy   Breast Normal Inspection, palpation, nipples normal bilaterally. Self breast exam has been taught. Patient performs self breast exam.   Respiratory Normal Auscultation - Normal, no wheezes or rales   Cardiovascular Normal Regular rate and rhythm. No murmurs, gallops, or rubs   Vascular Normal Pulses - Dorsalis pedis: Normal. Bruits - Carotids: Absent.    Extremity Normal No edema. No varicosities.   Abdomen Normal Inspection normal, BS active. No abdominal tenderness or masses palpable   Musculoskeletal Normal Overview - Normal. No swelling or deformities.   Skin Normal Inspection - Normal.   Neurological Normal Memory - Normal. Sensory - Normal. Motor - Normal. Balance & gait - Normal.   Psychiatric Normal Orientation - Oriented to time, place, person & situation. Appropriate mood and affect.          ASSESSMENT AND PLAN:   Le Diez is a 38 year old female who presents for a complete physical exam.  Self breast exam explained.   Anxiety-  will resume her medication  Health maintenance: Patient is due for  blood work  Pt' s weight is Body mass index is 33.13 kg/m²., recommended low fat diet and aerobic exercise 30 minutes three times weekly.  The patient indicates understanding of these issues and agrees to the plan.  The patient is asked to return for annual physical in 1 year.

## 2024-08-25 NOTE — TELEPHONE ENCOUNTER
Refill Per Protocol     Requested Prescriptions   Pending Prescriptions Disp Refills    OMEPRAZOLE 20 MG Oral Capsule Delayed Release [Pharmacy Med Name: Omeprazole Dr 20 Mg Cap Nort] 90 capsule 0     Sig: TAKE ONE CAPSULE BY MOUTH ONE TIME DAILY BEFORE BREAKFAST       Gastrointestional Medication Protocol Passed - 8/24/2024 10:52 AM        Passed - In person appointment or virtual visit in the past 12 mos or appointment in next 3 mos     Recent Outpatient Visits              2 days ago [facility]    Office Visit    1 year ago Annual physical exam    [facility]    Office Visit    1 year ago Encounter for gynecological examination    [facility] Meghan Ramirez MD    Office Visit    1 year ago Depo-Provera contraceptive status    [facility]    Nurse Only    1 year ago Acute right-sided low back pain with sciatica, sciatica laterality unspecified    [facility]    Office Visit          Future Appointments         Provider Department Appt Notes    In 2 months Meghan Ramirez MD [facility] Annual physical - insurance requested                           Future Appointments         Provider Department Appt Notes    In 2 months Meghan Ramirez MD [facility] Annual physical - insurance requested          Recent Outpatient Visits              2 days ago Annual physical exam    [facility]    Office Visit    1 year ago Annual physical exam    [facility]    Office Visit    1 year ago Encounter for  gynecological examination    [facility] Meghan Ramirez MD    Office Visit    1 year ago Depo-Provera contraceptive status    [facility]    Nurse Only    1 year ago Acute right-sided low back pain with sciatica, sciatica laterality unspecified    [facility], Brennen Hernandez Arlinda, MD    Office Visit

## 2024-09-27 ENCOUNTER — LAB ENCOUNTER (OUTPATIENT)
Dept: LAB | Facility: REFERENCE LAB | Age: 39
End: 2024-09-27
Attending: INTERNAL MEDICINE
Payer: COMMERCIAL

## 2024-09-27 DIAGNOSIS — Z00.00 ANNUAL PHYSICAL EXAM: ICD-10-CM

## 2024-09-27 DIAGNOSIS — E53.8 B12 DEFICIENCY: ICD-10-CM

## 2024-09-27 DIAGNOSIS — E55.9 VITAMIN D DEFICIENCY: ICD-10-CM

## 2024-09-27 LAB
ALBUMIN SERPL-MCNC: 4.1 G/DL (ref 3.2–4.8)
ALBUMIN/GLOB SERPL: 1.2 {RATIO} (ref 1–2)
ALP LIVER SERPL-CCNC: 52 U/L
ALT SERPL-CCNC: 8 U/L
ANION GAP SERPL CALC-SCNC: 5 MMOL/L (ref 0–18)
AST SERPL-CCNC: 13 U/L (ref ?–34)
BASOPHILS # BLD AUTO: 0.03 X10(3) UL (ref 0–0.2)
BASOPHILS NFR BLD AUTO: 0.5 %
BILIRUB SERPL-MCNC: 0.4 MG/DL (ref 0.3–1.2)
BUN BLD-MCNC: 10 MG/DL (ref 9–23)
BUN/CREAT SERPL: 9.3 (ref 10–20)
CALCIUM BLD-MCNC: 9.1 MG/DL (ref 8.7–10.4)
CHLORIDE SERPL-SCNC: 108 MMOL/L (ref 98–112)
CHOLEST SERPL-MCNC: 165 MG/DL (ref ?–200)
CO2 SERPL-SCNC: 28 MMOL/L (ref 21–32)
CREAT BLD-MCNC: 1.07 MG/DL
DEPRECATED RDW RBC AUTO: 44.2 FL (ref 35.1–46.3)
EGFRCR SERPLBLD CKD-EPI 2021: 68 ML/MIN/1.73M2 (ref 60–?)
EOSINOPHIL # BLD AUTO: 0.1 X10(3) UL (ref 0–0.7)
EOSINOPHIL NFR BLD AUTO: 1.7 %
ERYTHROCYTE [DISTWIDTH] IN BLOOD BY AUTOMATED COUNT: 13.8 % (ref 11–15)
EST. AVERAGE GLUCOSE BLD GHB EST-MCNC: 108 MG/DL (ref 68–126)
FASTING PATIENT LIPID ANSWER: YES
FASTING STATUS PATIENT QL REPORTED: YES
GLOBULIN PLAS-MCNC: 3.3 G/DL (ref 2–3.5)
GLUCOSE BLD-MCNC: 79 MG/DL (ref 70–99)
HBA1C MFR BLD: 5.4 % (ref ?–5.7)
HCT VFR BLD AUTO: 39.6 %
HDLC SERPL-MCNC: 45 MG/DL (ref 40–59)
HGB BLD-MCNC: 12.5 G/DL
IMM GRANULOCYTES # BLD AUTO: 0.01 X10(3) UL (ref 0–1)
IMM GRANULOCYTES NFR BLD: 0.2 %
LDLC SERPL CALC-MCNC: 99 MG/DL (ref ?–100)
LYMPHOCYTES # BLD AUTO: 3.1 X10(3) UL (ref 1–4)
LYMPHOCYTES NFR BLD AUTO: 53.4 %
MCH RBC QN AUTO: 27.4 PG (ref 26–34)
MCHC RBC AUTO-ENTMCNC: 31.6 G/DL (ref 31–37)
MCV RBC AUTO: 86.8 FL
MONOCYTES # BLD AUTO: 0.46 X10(3) UL (ref 0.1–1)
MONOCYTES NFR BLD AUTO: 7.9 %
NEUTROPHILS # BLD AUTO: 2.11 X10 (3) UL (ref 1.5–7.7)
NEUTROPHILS # BLD AUTO: 2.11 X10(3) UL (ref 1.5–7.7)
NEUTROPHILS NFR BLD AUTO: 36.3 %
NONHDLC SERPL-MCNC: 120 MG/DL (ref ?–130)
OSMOLALITY SERPL CALC.SUM OF ELEC: 290 MOSM/KG (ref 275–295)
PLATELET # BLD AUTO: 246 10(3)UL (ref 150–450)
POTASSIUM SERPL-SCNC: 3.8 MMOL/L (ref 3.5–5.1)
PROT SERPL-MCNC: 7.4 G/DL (ref 5.7–8.2)
RBC # BLD AUTO: 4.56 X10(6)UL
SODIUM SERPL-SCNC: 141 MMOL/L (ref 136–145)
TRIGL SERPL-MCNC: 114 MG/DL (ref 30–149)
TSI SER-ACNC: 0.9 MIU/ML (ref 0.55–4.78)
VIT B12 SERPL-MCNC: 498 PG/ML (ref 211–911)
VIT D+METAB SERPL-MCNC: 29.7 NG/ML (ref 30–100)
VLDLC SERPL CALC-MCNC: 19 MG/DL (ref 0–30)
WBC # BLD AUTO: 5.8 X10(3) UL (ref 4–11)

## 2024-09-27 PROCEDURE — 82306 VITAMIN D 25 HYDROXY: CPT

## 2024-09-27 PROCEDURE — 84443 ASSAY THYROID STIM HORMONE: CPT

## 2024-09-27 PROCEDURE — 36415 COLL VENOUS BLD VENIPUNCTURE: CPT

## 2024-09-27 PROCEDURE — 82607 VITAMIN B-12: CPT

## 2024-09-27 PROCEDURE — 80053 COMPREHEN METABOLIC PANEL: CPT

## 2024-09-27 PROCEDURE — 80061 LIPID PANEL: CPT

## 2024-09-27 PROCEDURE — 85025 COMPLETE CBC W/AUTO DIFF WBC: CPT

## 2024-09-27 PROCEDURE — 83036 HEMOGLOBIN GLYCOSYLATED A1C: CPT

## 2024-09-30 NOTE — PROGRESS NOTES
CBC Normal (white blood cells and red blood cells and platelets),   CMP Normal (electrolytes, and liver functions), slight decline in kidney function but better than prior.  Hydrate at least 48 ounces of water a day.No motrin, ibuprofen, advil, alleve, naprosyn  with these medications.    Lipid (choilesterol) is good,   Thyroid is good.   Vitamin D level is slightly low.  Would recommend an extra vitamin D 400 IUs daily over-the-counter and recheck vitamin D levels in 3 months.  Hemoglobin A1c which is a 3-month average of sugars is much better than prior.  Good job!  Goal is less than 6.5.  B12 level looks good.

## 2024-10-22 ENCOUNTER — OFFICE VISIT (OUTPATIENT)
Dept: OBGYN CLINIC | Facility: CLINIC | Age: 39
End: 2024-10-22
Payer: COMMERCIAL

## 2024-10-22 VITALS
BODY MASS INDEX: 33.12 KG/M2 | HEIGHT: 64 IN | WEIGHT: 194 LBS | SYSTOLIC BLOOD PRESSURE: 140 MMHG | HEART RATE: 56 BPM | DIASTOLIC BLOOD PRESSURE: 88 MMHG

## 2024-10-22 DIAGNOSIS — Z01.419 ENCOUNTER FOR GYNECOLOGICAL EXAMINATION: Primary | ICD-10-CM

## 2024-10-22 DIAGNOSIS — N89.8 VAGINAL ODOR: ICD-10-CM

## 2024-10-22 DIAGNOSIS — Z76.0 MEDICATION REFILL: ICD-10-CM

## 2024-10-22 DIAGNOSIS — Z11.3 SCREEN FOR STD (SEXUALLY TRANSMITTED DISEASE): ICD-10-CM

## 2024-10-22 PROCEDURE — 3008F BODY MASS INDEX DOCD: CPT | Performed by: OBSTETRICS & GYNECOLOGY

## 2024-10-22 PROCEDURE — 99395 PREV VISIT EST AGE 18-39: CPT | Performed by: OBSTETRICS & GYNECOLOGY

## 2024-10-22 PROCEDURE — 3079F DIAST BP 80-89 MM HG: CPT | Performed by: OBSTETRICS & GYNECOLOGY

## 2024-10-22 PROCEDURE — 3077F SYST BP >= 140 MM HG: CPT | Performed by: OBSTETRICS & GYNECOLOGY

## 2024-10-22 RX ORDER — NORETHINDRONE ACETATE AND ETHINYL ESTRADIOL 1.5-30(21)
1 KIT ORAL DAILY
Qty: 84 TABLET | Refills: 3 | Status: SHIPPED | OUTPATIENT
Start: 2024-10-22

## 2024-10-22 NOTE — PROGRESS NOTES
Le Diez is a 39 year old female  Patient's last menstrual period was 2024 (exact date). here for annual exam.       Last seen 2023.   Last pap 2023 normal with negative HPV    Doing well with Microgestin ..   Sometimes not consistent with pills.   Not breast feeding.  LMP 24.   Wants STD screen.    Having vaginal odor for a few weeks.  No discharge.    OBSTETRICS HISTORY:  OB History    Para Term  AB Living   0 0 0 0 0 0   SAB IAB Ectopic Multiple Live Births   0 0 0 0 0       GYNE HISTORY   Pap Date: 23  Pap Result Notes: Neg Pap/HPV // Mammo 19 Hunter benign    MEDICAL HISTORY:  Past Medical History:    Anxiety    Borderline diabetes    Depression    Panic attacks     No past surgical history on file.    SOCIAL HISTORY:  Social History     Socioeconomic History    Marital status: Single   Tobacco Use    Smoking status: Never    Smokeless tobacco: Never   Substance and Sexual Activity    Alcohol use: Yes     Comment: socially    Drug use: No    Sexual activity: Not Currently     Partners: Male     Birth control/protection: OCP     Social Drivers of Health      [facility], [facility]    Social Connections    [facility], [facility]    Housing Stability       FAMILY HISTORY:  Family History   Problem Relation Age of Onset    Hypertension Mother     Other (hypercholesterolemia) Mother     Other (osteopenia) Mother     Hypertension Maternal Grandmother     Hypertension Maternal Grandfather     Heart Disease Maternal Grandfather     Diabetes Maternal Grandfather     Breast Cancer Maternal Great-Grandparent         mat great aunt unknown age    Diabetes Father        MEDICATIONS:  Current Outpatient Medications   Medication Sig Dispense Refill    Norethin Ace-Eth Estrad-FE (BLAIR FE ) 1.5-30 MG-MCG Oral Tab Take 1 tablet by mouth daily. 84 tablet 3     omeprazole 20 MG Oral Capsule Delayed Release Take 1 capsule (20 mg total) by mouth before breakfast. 90 capsule 3    citalopram 10 MG Oral Tab Take 1 tablet (10 mg total) by mouth daily. 90 tablet 0    acyclovir 400 MG Oral Tab Take 1 tablet (400 mg total) by mouth 2 (two) times daily. 90 tablet 0       ALLERGIES:  Allergies[1]      Depression Screening (PHQ-2/PHQ-9): Over the LAST 2 WEEKS                         Review of Systems:  Constitutional:  Denies fatigue, night sweats, hot flashes  Eyes:  denies blurred or double vision  Cardiovascular:  denies chest pain or palpitations  Respiratory:  denies shortness of breath  Gastrointestinal:  denies heartburn, abdominal pain, diarrhea or constipation  Genitourinary:  denies dysuria, incontinence, abnormal vaginal discharge, vaginal itching  Musculoskeletal:  denies back pain.  Skin/Breast:  Denies any breast pain, lumps, or discharge.   Neurological:  denies headaches, extremity weakness or numbness.  Psychiatric: denies depression or anxiety.  Endocrine:   denies excessive thirst or urination.  Heme/Lymph:  easy bruising or bleeding.    PHYSICAL EXAM:   /88   Pulse 56   Ht 5' 4\" (1.626 m)   Wt 194 lb (88 kg)   LMP 09/21/2024 (Exact Date)   BMI 33.30 kg/m²   Wt Readings from Last 2 Encounters:   10/22/24 194 lb (88 kg)   08/23/24 193 lb (87.5 kg)     Body mass index is 33.3 kg/m².    Constitutional: well developed, well nourished  Neck/Thyroid: thyroid symmetric, no nodules  Heart:  Regular rate and rhythm  Lungs:  Clear to asculation  Breast: normal without palpable masses, tenderness, asymmetry, nipple discharge, nipple retraction or skin changes  Abdomen:  soft, nontender, nondistended, no masses  Psychiatric:  Oriented to time, place, person and situation. Appropriate mood and affect    Pelvic Exam:  External Genitalia: normal appearance, hair distribution, and no lesions  Urethral Meatus:  normal in size, location, without lesions and  prolapse  Bladder:  No fullness, masses or tenderness  Vagina:  Normal appearance without lesions, no abnormal discharge  Cervix:  Normal without tenderness on motion  Uterus: normal in size, contour, position, mobility, without tenderness  Adnexa: normal without masses or tenderness  Perineum/anus: normal      Assessment & Plan:    Le Diez is a 39 year old female who presents for an annual physical exam.    1. Encounter for gynecological examination  Pap not done.  ASCCP guidelines reviewed.   Encouraged annual exam.  Information on Kyleena IUD.   RTC 1 year or prn     2. Medication refill  - Norethin Ace-Eth Estrad-FE (BLAIR FE 1.5/30) 1.5-30 MG-MCG Oral Tab; Take 1 tablet by mouth daily.  Dispense: 84 tablet; Refill: 3    3. Screen for STD (sexually transmitted disease)  Gc/chlamydia/trichomonas.     4. Vaginal odor  Vaginal culture.        Requested Prescriptions     Signed Prescriptions Disp Refills    Norethin Ace-Eth Estrad-FE (BLAIR FE 1.5/30) 1.5-30 MG-MCG Oral Tab 84 tablet 3     Sig: Take 1 tablet by mouth daily.         Meghan Ramirez MD  10/22/2024  5:11 PM         [1] No Known Allergies

## 2024-10-23 ENCOUNTER — TELEPHONE (OUTPATIENT)
Dept: OBGYN CLINIC | Facility: CLINIC | Age: 39
End: 2024-10-23

## 2024-10-23 LAB
BV BACTERIA DNA VAG QL NAA+PROBE: NEGATIVE
C GLABRATA DNA VAG QL NAA+PROBE: NEGATIVE
C KRUSEI DNA VAG QL NAA+PROBE: NEGATIVE
C TRACH DNA SPEC QL NAA+PROBE: POSITIVE
CANDIDA DNA VAG QL NAA+PROBE: NEGATIVE
N GONORRHOEA DNA SPEC QL NAA+PROBE: NEGATIVE
T VAGINALIS DNA VAG QL NAA+PROBE: NEGATIVE
T VAGINALIS RRNA SPEC QL NAA+PROBE: NEGATIVE

## 2024-10-23 RX ORDER — DOXYCYCLINE HYCLATE 100 MG
100 TABLET ORAL 2 TIMES DAILY
Qty: 14 TABLET | Refills: 0 | Status: SHIPPED | OUTPATIENT
Start: 2024-10-23 | End: 2024-10-30

## 2024-10-23 NOTE — TELEPHONE ENCOUNTER
Doxycycline 100 mg bid x 7 days.   Test of cure 4 wks.  Partner needs to be treated  Negative gc/trichomonas.  Negative vaginal culture

## 2024-10-23 NOTE — TELEPHONE ENCOUNTER
Received call from Lab, patient positive for Chlamydia positive. Other STD's negative. Vaginosis culture still in process.     NKDA    To Dr. Ramirez, please review and advise. Thank you.

## 2024-10-24 NOTE — TELEPHONE ENCOUNTER
Patient with +chlamydia. She also states she would like to proceed with Kyleena insert with next cycle.  Dr. Ramirez- can we schedule her with next cycle or do we need to wait for test of cure first?

## 2025-01-06 RX ORDER — ACYCLOVIR 400 MG/1
400 TABLET ORAL 2 TIMES DAILY
Qty: 90 TABLET | Refills: 0 | Status: SHIPPED | OUTPATIENT
Start: 2025-01-06

## 2025-01-06 NOTE — TELEPHONE ENCOUNTER
Refill passed per [facility] protocol.  However rx last approved 07.19.2023 #[id number]. please advise if refill request appropriate?    Requested Prescriptions   Pending Prescriptions Disp Refills    ACYCLOVIR 400 MG Oral Tab [Pharmacy Med Name: Acyclovir 400 Mg Tab [facility]] 90 tablet 0     Sig: TAKE ONE TABLET BY MOUTH TWICE DAILY       Herpes Agent Protocol Passed - 1/5/2025  8:41 PM        Passed - In person appointment or virtual visit in the past 12 mos or appointment in next 3 mos     Recent Outpatient Visits              2 months ago Encounter for gynecological examination    [facility] - OB/GYN Meghan Ramirez MD    Office Visit    4 months ago Annual physical exam    [facility] Ar[facility], [facility]    Office Visit    1 year ago Annual physical exam    [facility] Arlinda, MD    Office Visit    1 year ago Encounter for gynecological examination    [facility] - [facility] Meghan Ramirez MD    Office Visit    1 year ago Depo-Provera contraceptive status    [facility] - OB/GYN    Nurse Only          Future Appointments         Provider Department Appt Notes    In 4 days Anjana Velasquez MD [facility] Brennen Beckett Tremors in my hand

## 2025-01-09 ENCOUNTER — OFFICE VISIT (OUTPATIENT)
Dept: INTERNAL MEDICINE CLINIC | Facility: CLINIC | Age: 40
End: 2025-01-09

## 2025-01-09 VITALS
HEIGHT: 64 IN | HEART RATE: 68 BPM | SYSTOLIC BLOOD PRESSURE: 120 MMHG | OXYGEN SATURATION: 100 % | WEIGHT: 197 LBS | DIASTOLIC BLOOD PRESSURE: 80 MMHG | BODY MASS INDEX: 33.63 KG/M2

## 2025-01-09 DIAGNOSIS — R25.1 TREMOR: Primary | ICD-10-CM

## 2025-01-09 PROCEDURE — 3008F BODY MASS INDEX DOCD: CPT | Performed by: INTERNAL MEDICINE

## 2025-01-09 PROCEDURE — 3079F DIAST BP 80-89 MM HG: CPT | Performed by: INTERNAL MEDICINE

## 2025-01-09 PROCEDURE — 3074F SYST BP LT 130 MM HG: CPT | Performed by: INTERNAL MEDICINE

## 2025-01-09 PROCEDURE — 99213 OFFICE O/P EST LOW 20 MIN: CPT | Performed by: INTERNAL MEDICINE

## 2025-01-09 NOTE — PROGRESS NOTES
Subjective:     Patient ID: Le Diez is a 39 year old female.    HPI    History/Other:   She came in today complaining of right hand tremor according to her this is ongoing for some time she states that whenever she holds something on her right hand for more than 2 minutes she starts shaking.  Denies any other symptoms it happens only with her right hand not with the left    Review of Systems   Constitutional: Negative.    HENT: Negative.     Respiratory: Negative.     Cardiovascular: Negative.    Gastrointestinal: Negative.    Endocrine: Negative.    Genitourinary: Negative.    Musculoskeletal: Negative.    Skin: Negative.    Neurological: Negative.    Hematological: Negative.    Psychiatric/Behavioral: Negative.       Current Outpatient Medications   Medication Sig Dispense Refill    ACYCLOVIR 400 MG Oral Tab TAKE ONE TABLET BY MOUTH TWICE DAILY 90 tablet 0    Norethin Ace-Eth Estrad-FE (BLAIR FE 1.5/30) 1.5-30 MG-MCG Oral Tab Take 1 tablet by mouth daily. 84 tablet 3    omeprazole 20 MG Oral Capsule Delayed Release Take 1 capsule (20 mg total) by mouth before breakfast. 90 capsule 3    citalopram 10 MG Oral Tab Take 1 tablet (10 mg total) by mouth daily. (Patient not taking: Reported on 1/9/2025) 90 tablet 0     Allergies:Allergies[1]    Past Medical History:    Anxiety    Borderline diabetes    Depression    Panic attacks      No past surgical history on file.   Family History   Problem Relation Age of Onset    Hypertension Mother     Other (hypercholesterolemia) Mother     Other (osteopenia) Mother     Hypertension Maternal Grandmother     Hypertension Maternal Grandfather     Heart Disease Maternal Grandfather     Diabetes Maternal Grandfather     Breast Cancer Maternal Great-Grandparent         mat great aunt unknown age    Diabetes Father       Social History:   Social History     Socioeconomic History    Marital status: Single   Tobacco Use    Smoking status: Never    Smokeless tobacco: Never    Substance and Sexual Activity    Alcohol use: Yes     Comment: socially    Drug use: No    Sexual activity: Not Currently     Partners: Male     Birth control/protection: OCP     Social Drivers of Health      [facility]    Social Connections    [facility] from [facility], [facility]    Housing Stability        Objective:   Physical Exam  Vitals and nursing note reviewed.   Constitutional:       Appearance: Normal appearance.   HENT:      Head: Normocephalic and atraumatic.   Cardiovascular:      Rate and Rhythm: Normal rate and regular rhythm.      Pulses: Normal pulses.      Heart sounds: Normal heart sounds.   Pulmonary:      Effort: Pulmonary effort is normal.      Breath sounds: Normal breath sounds.   Abdominal:      Palpations: Abdomen is soft.   Musculoskeletal:         General: Normal range of motion.      Cervical back: Normal range of motion and neck supple.   Skin:     General: Skin is warm.   Neurological:      Mental Status: She is alert. Mental status is at baseline.   Psychiatric:         Mood and Affect: Mood normal.         Assessment & Plan:   1. Tremor    Etiology unclear , since happens only with certain position but could be related to the fatigue I will refer to see  neurology    No orders of the defined types were placed in this encounter.      Meds This Visit:  Requested Prescriptions      No prescriptions requested or ordered in this encounter       Imaging & Referrals:  NEURO - INTERNAL            [1] No Known Allergies

## 2025-01-24 ENCOUNTER — OFFICE VISIT (OUTPATIENT)
Dept: OBGYN CLINIC | Facility: CLINIC | Age: 40
End: 2025-01-24
Payer: COMMERCIAL

## 2025-01-24 VITALS
SYSTOLIC BLOOD PRESSURE: 124 MMHG | BODY MASS INDEX: 34 KG/M2 | DIASTOLIC BLOOD PRESSURE: 85 MMHG | WEIGHT: 196 LBS | HEART RATE: 83 BPM

## 2025-01-24 DIAGNOSIS — Z20.2 CHLAMYDIA CONTACT, TREATED: Primary | ICD-10-CM

## 2025-01-24 PROCEDURE — 3079F DIAST BP 80-89 MM HG: CPT | Performed by: OBSTETRICS & GYNECOLOGY

## 2025-01-24 PROCEDURE — 3074F SYST BP LT 130 MM HG: CPT | Performed by: OBSTETRICS & GYNECOLOGY

## 2025-01-24 PROCEDURE — 99212 OFFICE O/P EST SF 10 MIN: CPT | Performed by: OBSTETRICS & GYNECOLOGY

## 2025-01-26 NOTE — PROGRESS NOTES
Le Diez is a 39 year old female  Patient's last menstrual period was 2025 (exact date).   Chief Complaint   Patient presents with    Gyn Exam     Last seen 10/22/2024.   Here for test of cure.   Had +chlamydia.   Treated.   Pt confused how she got it since her last sexual contact was 2 years ago.   No c/o      OBSTETRICS HISTORY:  OB History    Para Term  AB Living   0 0 0 0 0 0   SAB IAB Ectopic Multiple Live Births   0 0 0 0 0       GYNE HISTORY   Pap Date: 23  Pap Result Notes: negative, HPV negative    MEDICAL HISTORY:  Past Medical History:    Anxiety    Borderline diabetes    Depression    Panic attacks     No past surgical history on file.    SOCIAL HISTORY:  Social History     Socioeconomic History    Marital status: Single   Tobacco Use    Smoking status: Never     Passive exposure: Never    Smokeless tobacco: Never   Vaping Use    Vaping status: Never Used   Substance and Sexual Activity    Alcohol use: Yes     Comment: socially    Drug use: No    Sexual activity: Not Currently     Partners: Male     Birth control/protection: OCP     Social Drivers of Health      Received from [facility], [facility]    Social Connections    Received from [facility], [facility]    Housing Stability       MEDICATIONS:  Current Outpatient Medications   Medication Sig Dispense Refill    ACYCLOVIR 400 MG Oral Tab TAKE ONE TABLET BY MOUTH TWICE DAILY 90 tablet 0    Norethin Ace-Eth Estrad-FE (BLAIR FE ) 1.5-30 MG-MCG Oral Tab Take 1 tablet by mouth daily. 84 tablet 3    omeprazole 20 MG Oral Capsule Delayed Release Take 1 capsule (20 mg total) by mouth before breakfast. 90 capsule 3    citalopram 10 MG Oral Tab Take 1 tablet (10 mg total) by mouth daily. (Patient not taking: Reported on 2025) 90 tablet 0       ALLERGIES:  Allergies[1]      PHYSICAL EXAM:   /85   Pulse 83   Wt 196 lb (88.9  kg)   LMP 01/14/2025 (Exact Date)   BMI 33.64 kg/m²   Body mass index is 33.64 kg/m².    Constitutional: well developed, well nourished    Pelvic Exam:  External Genitalia: normal appearance, hair distribution, and no lesions  Urethral Meatus:  normal in size, location, without lesions and prolapse  Vagina:  Normal appearance without lesions, no abnormal discharge  Cervix:  Normal       Assessment & Plan:  1. Chlamydia contact, treated  Gc/chlamydia/trichomonas.   RTC prn      Requested Prescriptions      No prescriptions requested or ordered in this encounter              [1] No Known Allergies

## 2025-01-27 LAB
C TRACH DNA SPEC QL NAA+PROBE: NEGATIVE
N GONORRHOEA DNA SPEC QL NAA+PROBE: NEGATIVE
T VAGINALIS RRNA SPEC QL NAA+PROBE: NEGATIVE

## 2025-07-16 ENCOUNTER — TELEMEDICINE (OUTPATIENT)
Dept: INTERNAL MEDICINE CLINIC | Facility: CLINIC | Age: 40
End: 2025-07-16
Payer: COMMERCIAL

## 2025-07-16 DIAGNOSIS — N30.00 ACUTE CYSTITIS WITHOUT HEMATURIA: ICD-10-CM

## 2025-07-16 DIAGNOSIS — R73.01 IFG (IMPAIRED FASTING GLUCOSE): ICD-10-CM

## 2025-07-16 DIAGNOSIS — R35.0 FREQUENT URINATION: Primary | ICD-10-CM

## 2025-07-16 PROCEDURE — 98005 SYNCH AUDIO-VIDEO EST LOW 20: CPT | Performed by: INTERNAL MEDICINE

## 2025-07-16 NOTE — PROGRESS NOTES
This is a telemedicine visit with live, interactive video and audio.     Patient understands and accepts financial responsibility for any deductible, co-insurance and/or co-pays associated with this service.    SUBJECTIVE    She is scheduled video visit today to discuss few complaints.  According to her she does report frequent urination mostly during the night.  She is trying to cut down on water that she takes after 7:00.  But she still has frequent urination.  She does have family history of diabetes so she wants to get tested.  She denies any burning or pain during urination.  She does feel thirsty on and off    She also has some mild pain on her right foot between midfoot and heel.  She feels some numbness or on the side of the foot  HISTORY:  Past Medical History[1]   Past Surgical History[2]   Family History[3]   Short Social Hx on File[4]     Allergies[5]   Current Medications[6]  Ros : frequent urination  OBJECTIVE  Physical Exam:   Awake alert     ASSESSMENT & PLAN  Diagnoses and all orders for this visit:  Frequent urination will check UA, will check hemoglobin A1c      Right foot pain possible plantar fasciitis I did advise her to use shoe inserts, comfortable shoes, if pain persist let us know      CONCHIS SUAREZ MD             [1]   Past Medical History:   Anxiety    Borderline diabetes    Depression    Panic attacks   [2] No past surgical history on file.  [3]   Family History  Problem Relation Age of Onset    Hypertension Mother     Other (hypercholesterolemia) Mother     Other (osteopenia) Mother     Hypertension Maternal Grandmother     Hypertension Maternal Grandfather     Heart Disease Maternal Grandfather     Diabetes Maternal Grandfather     Breast Cancer Maternal Great-Grandparent         mat great aunt unknown age    Diabetes Father    [4]   Social History  Socioeconomic History    Marital status: Single   Tobacco Use    Smoking status: Never     Passive exposure: Never    Smokeless tobacco:  Never   Vaping Use    Vaping status: Never Used   Substance and Sexual Activity    Alcohol use: Yes     Comment: socially    Drug use: No    Sexual activity: Not Currently     Partners: Male     Birth control/protection: OCP     Social Drivers of Health      Received from [facility]    Housing Stability   [5] No Known Allergies  [6]   Current Outpatient Medications   Medication Sig Dispense Refill    ACYCLOVIR 400 MG Oral Tab TAKE ONE TABLET BY MOUTH TWICE DAILY 90 tablet 0    Norethin Ace-Eth Estrad-FE (BLAIR FE 1.5/30) 1.5-30 MG-MCG Oral Tab Take 1 tablet by mouth daily. 84 tablet 3    omeprazole 20 MG Oral Capsule Delayed Release Take 1 capsule (20 mg total) by mouth before breakfast. 90 capsule 3    citalopram 10 MG Oral Tab Take 1 tablet (10 mg total) by mouth daily. (Patient not taking: Reported on 1/24/2025) 90 tablet 0

## 2025-07-18 ENCOUNTER — LAB ENCOUNTER (OUTPATIENT)
Dept: LAB | Facility: HOSPITAL | Age: 40
End: 2025-07-18
Attending: INTERNAL MEDICINE
Payer: COMMERCIAL

## 2025-07-18 DIAGNOSIS — N30.00 ACUTE CYSTITIS WITHOUT HEMATURIA: ICD-10-CM

## 2025-07-18 DIAGNOSIS — R73.01 IFG (IMPAIRED FASTING GLUCOSE): ICD-10-CM

## 2025-07-18 LAB
BILIRUB UR QL STRIP.AUTO: NEGATIVE
CLARITY UR REFRACT.AUTO: CLEAR
EST. AVERAGE GLUCOSE BLD GHB EST-MCNC: 117 MG/DL (ref 68–126)
GLUCOSE UR STRIP.AUTO-MCNC: NORMAL MG/DL
HBA1C MFR BLD: 5.7 % (ref ?–5.7)
KETONES UR STRIP.AUTO-MCNC: NEGATIVE MG/DL
LEUKOCYTE ESTERASE UR QL STRIP.AUTO: 75
NITRITE UR QL STRIP.AUTO: NEGATIVE
PH UR STRIP.AUTO: 6 [PH] (ref 5–8)
PROT UR STRIP.AUTO-MCNC: NEGATIVE MG/DL
RBC UR QL AUTO: NEGATIVE
SP GR UR STRIP.AUTO: 1.03 (ref 1–1.03)
UROBILINOGEN UR STRIP.AUTO-MCNC: NORMAL MG/DL

## 2025-07-18 PROCEDURE — 87086 URINE CULTURE/COLONY COUNT: CPT

## 2025-07-18 PROCEDURE — 81001 URINALYSIS AUTO W/SCOPE: CPT

## 2025-07-18 PROCEDURE — 36415 COLL VENOUS BLD VENIPUNCTURE: CPT

## 2025-07-18 PROCEDURE — 83036 HEMOGLOBIN GLYCOSYLATED A1C: CPT

## 2025-08-27 ENCOUNTER — PATIENT MESSAGE (OUTPATIENT)
Dept: OBGYN CLINIC | Facility: CLINIC | Age: 40
End: 2025-08-27

## (undated) NOTE — LETTER
Date & Time: 5/14/2023, 3:11 PM  Patient: Geoff Najera  Encounter Provider(s):    Jose Cruz Adhikari MD       To Whom It May Concern:    Yady Campo was seen and treated in our department on 5/14/2023. She should not return to work until Wednesday, May 17 May return sooner if better .     If you have any questions or concerns, please do not hesitate to call.        _____________________________  Physician/APC Signature

## (undated) NOTE — MR AVS SNAPSHOT
After Visit Summary   12/4/2020    Kenneth Anjel    MRN: [id number]           Visit Information     Date & Time  12/4/2020 10:00 AM Provider  Jorgito Crook MD [facility], [address] [place] [address] Dept.  Phone  [phone]      Your Future Labs/Procedures Expected by Expires    CBC WITH DIFFERENTIAL WITH PLATELET [[id number] CUSTOM]  12/4/2020 (Approximate) 44/3/1643    COMP METABOLIC PANEL (14) [[id number] CUSTOM]  12/4/2020 (Approximate) 12/4/2021    LIPID PANEL [[id number] CUSTOM]  12/4/20 [facility]  Monday – Friday  8:00 am – 8:00 pm   Saturday – Sunday  8:00 am – 4:00 pm    WALK-IN CARE  Primary Care Providers  Treatment for acute illness   or injury that are   non-life-threatening.   Also available by appointment